# Patient Record
Sex: FEMALE | Race: WHITE | NOT HISPANIC OR LATINO
[De-identification: names, ages, dates, MRNs, and addresses within clinical notes are randomized per-mention and may not be internally consistent; named-entity substitution may affect disease eponyms.]

---

## 2017-03-27 ENCOUNTER — APPOINTMENT (OUTPATIENT)
Dept: ORTHOPEDIC SURGERY | Facility: CLINIC | Age: 67
End: 2017-03-27

## 2017-12-28 ENCOUNTER — OUTPATIENT (OUTPATIENT)
Dept: OUTPATIENT SERVICES | Facility: HOSPITAL | Age: 67
LOS: 1 days | End: 2017-12-28
Payer: MEDICARE

## 2017-12-28 VITALS
RESPIRATION RATE: 14 BRPM | OXYGEN SATURATION: 98 % | WEIGHT: 200.62 LBS | DIASTOLIC BLOOD PRESSURE: 70 MMHG | SYSTOLIC BLOOD PRESSURE: 146 MMHG | HEIGHT: 61 IN | TEMPERATURE: 98 F | HEART RATE: 75 BPM

## 2017-12-28 DIAGNOSIS — Z01.818 ENCOUNTER FOR OTHER PREPROCEDURAL EXAMINATION: ICD-10-CM

## 2017-12-28 DIAGNOSIS — Z87.39 PERSONAL HISTORY OF OTHER DISEASES OF THE MUSCULOSKELETAL SYSTEM AND CONNECTIVE TISSUE: ICD-10-CM

## 2017-12-28 DIAGNOSIS — M17.12 UNILATERAL PRIMARY OSTEOARTHRITIS, LEFT KNEE: ICD-10-CM

## 2017-12-28 LAB
ALBUMIN SERPL ELPH-MCNC: 3.4 G/DL — SIGNIFICANT CHANGE UP (ref 3.3–5)
ALP SERPL-CCNC: 61 U/L — SIGNIFICANT CHANGE UP (ref 30–120)
ALT FLD-CCNC: 58 U/L DA — SIGNIFICANT CHANGE UP (ref 10–60)
ANION GAP SERPL CALC-SCNC: 6 MMOL/L — SIGNIFICANT CHANGE UP (ref 5–17)
APTT BLD: 29.7 SEC — SIGNIFICANT CHANGE UP (ref 27.5–37.4)
AST SERPL-CCNC: 30 U/L — SIGNIFICANT CHANGE UP (ref 10–40)
BILIRUB SERPL-MCNC: 0.3 MG/DL — SIGNIFICANT CHANGE UP (ref 0.2–1.2)
BLD GP AB SCN SERPL QL: SIGNIFICANT CHANGE UP
BUN SERPL-MCNC: 20 MG/DL — SIGNIFICANT CHANGE UP (ref 7–23)
CALCIUM SERPL-MCNC: 9.4 MG/DL — SIGNIFICANT CHANGE UP (ref 8.4–10.5)
CHLORIDE SERPL-SCNC: 104 MMOL/L — SIGNIFICANT CHANGE UP (ref 96–108)
CO2 SERPL-SCNC: 27 MMOL/L — SIGNIFICANT CHANGE UP (ref 22–31)
CREAT SERPL-MCNC: 0.76 MG/DL — SIGNIFICANT CHANGE UP (ref 0.5–1.3)
GLUCOSE SERPL-MCNC: 127 MG/DL — HIGH (ref 70–99)
HCT VFR BLD CALC: 46.3 % — HIGH (ref 34.5–45)
HGB BLD-MCNC: 16 G/DL — HIGH (ref 11.5–15.5)
INR BLD: 0.94 RATIO — SIGNIFICANT CHANGE UP (ref 0.88–1.16)
MCHC RBC-ENTMCNC: 30.5 PG — SIGNIFICANT CHANGE UP (ref 27–34)
MCHC RBC-ENTMCNC: 34.5 GM/DL — SIGNIFICANT CHANGE UP (ref 32–36)
MCV RBC AUTO: 88.3 FL — SIGNIFICANT CHANGE UP (ref 80–100)
PLATELET # BLD AUTO: 192 K/UL — SIGNIFICANT CHANGE UP (ref 150–400)
POTASSIUM SERPL-MCNC: 4.2 MMOL/L — SIGNIFICANT CHANGE UP (ref 3.5–5.3)
POTASSIUM SERPL-SCNC: 4.2 MMOL/L — SIGNIFICANT CHANGE UP (ref 3.5–5.3)
PROT SERPL-MCNC: 7.3 G/DL — SIGNIFICANT CHANGE UP (ref 6–8.3)
PROTHROM AB SERPL-ACNC: 10.2 SEC — SIGNIFICANT CHANGE UP (ref 9.8–12.7)
RBC # BLD: 5.25 M/UL — HIGH (ref 3.8–5.2)
RBC # FLD: 12.3 % — SIGNIFICANT CHANGE UP (ref 10.3–14.5)
SODIUM SERPL-SCNC: 137 MMOL/L — SIGNIFICANT CHANGE UP (ref 135–145)
WBC # BLD: 9.9 K/UL — SIGNIFICANT CHANGE UP (ref 3.8–10.5)
WBC # FLD AUTO: 9.9 K/UL — SIGNIFICANT CHANGE UP (ref 3.8–10.5)

## 2017-12-28 PROCEDURE — 86900 BLOOD TYPING SEROLOGIC ABO: CPT

## 2017-12-28 PROCEDURE — 93010 ELECTROCARDIOGRAM REPORT: CPT | Mod: NC

## 2017-12-28 PROCEDURE — G0463: CPT

## 2017-12-28 PROCEDURE — 87640 STAPH A DNA AMP PROBE: CPT

## 2017-12-28 PROCEDURE — 93005 ELECTROCARDIOGRAM TRACING: CPT

## 2017-12-28 PROCEDURE — 86850 RBC ANTIBODY SCREEN: CPT

## 2017-12-28 PROCEDURE — 87641 MR-STAPH DNA AMP PROBE: CPT

## 2017-12-28 PROCEDURE — 80053 COMPREHEN METABOLIC PANEL: CPT

## 2017-12-28 PROCEDURE — 85730 THROMBOPLASTIN TIME PARTIAL: CPT

## 2017-12-28 PROCEDURE — 85610 PROTHROMBIN TIME: CPT

## 2017-12-28 PROCEDURE — 85027 COMPLETE CBC AUTOMATED: CPT

## 2017-12-28 PROCEDURE — 86901 BLOOD TYPING SEROLOGIC RH(D): CPT

## 2017-12-28 RX ORDER — PREGABALIN 225 MG/1
1 CAPSULE ORAL
Qty: 0 | Refills: 0 | COMMUNITY

## 2017-12-28 NOTE — H&P PST ADULT - PMH
Anxiety    Dyslipidemia    H/O vein stripping  bilateral right 2006 and left 2016  History of osteoarthritis    HTN (Hypertension)    Hypertension    Insomnia    Lumbar stenosis    Morbid obesity    Scoliosis

## 2017-12-28 NOTE — H&P PST ADULT - HISTORY OF PRESENT ILLNESS
67 y.o . female presents here w/ long standing hx. of left knee pain 10/10 w/ decreased R.O.M., ambulation, mobility and ADL function. Seen by Dr. Castellon and referred for surgery. P.T. and Injection no help.  Takes Ibuprofen for pain w some relief rarely last dose 2 months ago

## 2017-12-28 NOTE — H&P PST ADULT - LYMPH NODES
August 28, 2017    1) Stop lisinopril.    2) Start losartan 50 mg daily in am.    See Dr. Sommer on Wednesday as scheduled.    Raul Solis MD   No lymphadedenopathy

## 2017-12-28 NOTE — H&P PST ADULT - PROBLEM SELECTOR PLAN 1
Left Total Knee Replacement  NPO after Midnight. Take pepcid as ordered.  Nose cx instructions reviewed. Dietary instructions reviewed. 3day wipes reviewed. D/C instructions reviewed.  Patient advised of no jewelry day of surgery.  Handouts given.  Medication reviewed. Stop NSAIDS, ASA herbals, vit E per PMD instructions or 7 days prior to surgery .  Medical Clearance to be obtained.  O.A. D.J.D left knee  Day of Surgery you can take the following meds with a small sip of water. migue

## 2017-12-28 NOTE — H&P PST ADULT - NSANTHOSAYNRD_GEN_A_CORE
No. ARI screening performed.  STOP BANG Legend: 0-2 = LOW Risk; 3-4 = INTERMEDIATE Risk; 5-8 = HIGH Risk

## 2017-12-29 LAB
MRSA PCR RESULT.: SIGNIFICANT CHANGE UP
S AUREUS DNA NOSE QL NAA+PROBE: SIGNIFICANT CHANGE UP

## 2018-01-05 ENCOUNTER — APPOINTMENT (OUTPATIENT)
Dept: INTERNAL MEDICINE | Facility: CLINIC | Age: 68
End: 2018-01-05
Payer: MEDICARE

## 2018-01-05 VITALS
HEIGHT: 62 IN | BODY MASS INDEX: 36.8 KG/M2 | SYSTOLIC BLOOD PRESSURE: 154 MMHG | DIASTOLIC BLOOD PRESSURE: 70 MMHG | WEIGHT: 200 LBS | HEART RATE: 68 BPM | OXYGEN SATURATION: 96 % | TEMPERATURE: 98.5 F

## 2018-01-05 PROCEDURE — 99214 OFFICE O/P EST MOD 30 MIN: CPT

## 2018-01-05 RX ORDER — APREPITANT 80 MG/1
40 CAPSULE ORAL ONCE
Qty: 0 | Refills: 0 | Status: COMPLETED | OUTPATIENT
Start: 2018-02-06 | End: 2018-02-06

## 2018-01-12 RX ORDER — SODIUM CHLORIDE 9 MG/ML
1000 INJECTION, SOLUTION INTRAVENOUS
Qty: 0 | Refills: 0 | Status: DISCONTINUED | OUTPATIENT
Start: 2018-02-06 | End: 2018-02-07

## 2018-01-12 RX ORDER — MAGNESIUM HYDROXIDE 400 MG/1
30 TABLET, CHEWABLE ORAL DAILY
Qty: 0 | Refills: 0 | Status: DISCONTINUED | OUTPATIENT
Start: 2018-02-06 | End: 2018-02-09

## 2018-01-12 RX ORDER — ONDANSETRON 8 MG/1
4 TABLET, FILM COATED ORAL EVERY 6 HOURS
Qty: 0 | Refills: 0 | Status: DISCONTINUED | OUTPATIENT
Start: 2018-02-06 | End: 2018-02-09

## 2018-01-12 RX ORDER — SENNA PLUS 8.6 MG/1
2 TABLET ORAL AT BEDTIME
Qty: 0 | Refills: 0 | Status: DISCONTINUED | OUTPATIENT
Start: 2018-02-06 | End: 2018-02-09

## 2018-01-12 RX ORDER — PANTOPRAZOLE SODIUM 20 MG/1
40 TABLET, DELAYED RELEASE ORAL
Qty: 0 | Refills: 0 | Status: DISCONTINUED | OUTPATIENT
Start: 2018-02-06 | End: 2018-02-09

## 2018-01-12 RX ORDER — POLYETHYLENE GLYCOL 3350 17 G/17G
17 POWDER, FOR SOLUTION ORAL DAILY
Qty: 0 | Refills: 0 | Status: DISCONTINUED | OUTPATIENT
Start: 2018-02-06 | End: 2018-02-09

## 2018-01-24 ENCOUNTER — OUTPATIENT (OUTPATIENT)
Dept: OUTPATIENT SERVICES | Facility: HOSPITAL | Age: 68
LOS: 1 days | End: 2018-01-24
Payer: MEDICARE

## 2018-01-24 VITALS
HEIGHT: 62 IN | DIASTOLIC BLOOD PRESSURE: 78 MMHG | RESPIRATION RATE: 18 BRPM | HEART RATE: 88 BPM | OXYGEN SATURATION: 98 % | TEMPERATURE: 98 F | SYSTOLIC BLOOD PRESSURE: 148 MMHG | WEIGHT: 199.96 LBS

## 2018-01-24 DIAGNOSIS — Z01.818 ENCOUNTER FOR OTHER PREPROCEDURAL EXAMINATION: ICD-10-CM

## 2018-01-24 DIAGNOSIS — M17.12 UNILATERAL PRIMARY OSTEOARTHRITIS, LEFT KNEE: ICD-10-CM

## 2018-01-24 DIAGNOSIS — M24.662 ANKYLOSIS, LEFT KNEE: ICD-10-CM

## 2018-01-24 LAB
ALBUMIN SERPL ELPH-MCNC: 3.6 G/DL — SIGNIFICANT CHANGE UP (ref 3.3–5)
ALP SERPL-CCNC: 58 U/L — SIGNIFICANT CHANGE UP (ref 30–120)
ALT FLD-CCNC: 55 U/L DA — SIGNIFICANT CHANGE UP (ref 10–60)
ANION GAP SERPL CALC-SCNC: 10 MMOL/L — SIGNIFICANT CHANGE UP (ref 5–17)
APTT BLD: 29.6 SEC — SIGNIFICANT CHANGE UP (ref 27.5–37.4)
AST SERPL-CCNC: 34 U/L — SIGNIFICANT CHANGE UP (ref 10–40)
BILIRUB SERPL-MCNC: 0.4 MG/DL — SIGNIFICANT CHANGE UP (ref 0.2–1.2)
BLD GP AB SCN SERPL QL: SIGNIFICANT CHANGE UP
BUN SERPL-MCNC: 17 MG/DL — SIGNIFICANT CHANGE UP (ref 7–23)
CALCIUM SERPL-MCNC: 9.3 MG/DL — SIGNIFICANT CHANGE UP (ref 8.4–10.5)
CHLORIDE SERPL-SCNC: 103 MMOL/L — SIGNIFICANT CHANGE UP (ref 96–108)
CO2 SERPL-SCNC: 29 MMOL/L — SIGNIFICANT CHANGE UP (ref 22–31)
CREAT SERPL-MCNC: 0.72 MG/DL — SIGNIFICANT CHANGE UP (ref 0.5–1.3)
GLUCOSE SERPL-MCNC: 111 MG/DL — HIGH (ref 70–99)
HCT VFR BLD CALC: 47.2 % — HIGH (ref 34.5–45)
HGB BLD-MCNC: 16 G/DL — HIGH (ref 11.5–15.5)
INR BLD: 0.96 RATIO — SIGNIFICANT CHANGE UP (ref 0.88–1.16)
MCHC RBC-ENTMCNC: 29.3 PG — SIGNIFICANT CHANGE UP (ref 27–34)
MCHC RBC-ENTMCNC: 33.9 GM/DL — SIGNIFICANT CHANGE UP (ref 32–36)
MCV RBC AUTO: 86.5 FL — SIGNIFICANT CHANGE UP (ref 80–100)
MRSA PCR RESULT.: SIGNIFICANT CHANGE UP
PLATELET # BLD AUTO: 187 K/UL — SIGNIFICANT CHANGE UP (ref 150–400)
POTASSIUM SERPL-MCNC: 4.4 MMOL/L — SIGNIFICANT CHANGE UP (ref 3.5–5.3)
POTASSIUM SERPL-SCNC: 4.4 MMOL/L — SIGNIFICANT CHANGE UP (ref 3.5–5.3)
PROT SERPL-MCNC: 7.6 G/DL — SIGNIFICANT CHANGE UP (ref 6–8.3)
PROTHROM AB SERPL-ACNC: 10.4 SEC — SIGNIFICANT CHANGE UP (ref 9.8–12.7)
RBC # BLD: 5.45 M/UL — HIGH (ref 3.8–5.2)
RBC # FLD: 12 % — SIGNIFICANT CHANGE UP (ref 10.3–14.5)
S AUREUS DNA NOSE QL NAA+PROBE: SIGNIFICANT CHANGE UP
SODIUM SERPL-SCNC: 142 MMOL/L — SIGNIFICANT CHANGE UP (ref 135–145)
WBC # BLD: 9.3 K/UL — SIGNIFICANT CHANGE UP (ref 3.8–10.5)
WBC # FLD AUTO: 9.3 K/UL — SIGNIFICANT CHANGE UP (ref 3.8–10.5)

## 2018-01-24 PROCEDURE — 85027 COMPLETE CBC AUTOMATED: CPT

## 2018-01-24 PROCEDURE — 86901 BLOOD TYPING SEROLOGIC RH(D): CPT

## 2018-01-24 PROCEDURE — 85730 THROMBOPLASTIN TIME PARTIAL: CPT

## 2018-01-24 PROCEDURE — 80053 COMPREHEN METABOLIC PANEL: CPT

## 2018-01-24 PROCEDURE — 87640 STAPH A DNA AMP PROBE: CPT

## 2018-01-24 PROCEDURE — 85610 PROTHROMBIN TIME: CPT

## 2018-01-24 PROCEDURE — 86850 RBC ANTIBODY SCREEN: CPT

## 2018-01-24 PROCEDURE — G0463: CPT

## 2018-01-24 PROCEDURE — 86900 BLOOD TYPING SEROLOGIC ABO: CPT

## 2018-01-24 RX ORDER — LANOLIN ALCOHOL/MO/W.PET/CERES
1 CREAM (GRAM) TOPICAL
Qty: 0 | Refills: 0 | COMMUNITY

## 2018-01-24 NOTE — H&P PST ADULT - HISTORY OF PRESENT ILLNESS
68 y.o . female presents here w/ long standing hx. of left knee pain 10/10 w/ decreased R.O.M., ambulation, mobility and ADL function. Seen by Dr. Castellon and referred for surgery. P.T. and Injection no help.  Takes Ibuprofen for pain w some relief rarely last dose 2 months ago. Cancelled surgery from 1/16/18 and rescheduled for 2/6/18 as she was not feeling good

## 2018-01-24 NOTE — H&P PST ADULT - RS GEN PE MLT RESP DETAILS PC
respirations non-labored/clear to auscultation bilaterally/good air movement/breath sounds equal/normal/airway patent

## 2018-01-24 NOTE — H&P PST ADULT - PMH
Anxiety    Dyslipidemia    H/O vein stripping  bilateral right 2006 and left 2016  History of osteoarthritis    HTN (Hypertension)    Insomnia    Lumbar stenosis    Morbid obesity    OA (osteoarthritis) of knee    Scoliosis

## 2018-02-05 RX ORDER — CELECOXIB 200 MG/1
200 CAPSULE ORAL ONCE
Qty: 0 | Refills: 0 | Status: COMPLETED | OUTPATIENT
Start: 2018-02-06 | End: 2018-02-06

## 2018-02-05 RX ORDER — DOCUSATE SODIUM 100 MG
100 CAPSULE ORAL THREE TIMES A DAY
Qty: 0 | Refills: 0 | Status: DISCONTINUED | OUTPATIENT
Start: 2018-02-06 | End: 2018-02-09

## 2018-02-06 ENCOUNTER — RESULT REVIEW (OUTPATIENT)
Age: 68
End: 2018-02-06

## 2018-02-06 ENCOUNTER — INPATIENT (INPATIENT)
Facility: HOSPITAL | Age: 68
LOS: 2 days | Discharge: HOME | DRG: 470 | End: 2018-02-09
Attending: SPECIALIST | Admitting: SPECIALIST
Payer: MEDICARE

## 2018-02-06 ENCOUNTER — TRANSCRIPTION ENCOUNTER (OUTPATIENT)
Age: 68
End: 2018-02-06

## 2018-02-06 VITALS
RESPIRATION RATE: 13 BRPM | OXYGEN SATURATION: 97 % | HEIGHT: 62 IN | DIASTOLIC BLOOD PRESSURE: 73 MMHG | SYSTOLIC BLOOD PRESSURE: 179 MMHG | HEART RATE: 84 BPM | WEIGHT: 197.09 LBS | TEMPERATURE: 98 F

## 2018-02-06 DIAGNOSIS — E78.5 HYPERLIPIDEMIA, UNSPECIFIED: ICD-10-CM

## 2018-02-06 DIAGNOSIS — M17.12 UNILATERAL PRIMARY OSTEOARTHRITIS, LEFT KNEE: ICD-10-CM

## 2018-02-06 DIAGNOSIS — E66.9 OBESITY, UNSPECIFIED: ICD-10-CM

## 2018-02-06 DIAGNOSIS — Z01.818 ENCOUNTER FOR OTHER PREPROCEDURAL EXAMINATION: ICD-10-CM

## 2018-02-06 DIAGNOSIS — Z47.1 AFTERCARE FOLLOWING JOINT REPLACEMENT SURGERY: ICD-10-CM

## 2018-02-06 DIAGNOSIS — I10 ESSENTIAL (PRIMARY) HYPERTENSION: ICD-10-CM

## 2018-02-06 DIAGNOSIS — F41.9 ANXIETY DISORDER, UNSPECIFIED: ICD-10-CM

## 2018-02-06 LAB
ANION GAP SERPL CALC-SCNC: 15 MMOL/L — SIGNIFICANT CHANGE UP (ref 5–17)
BUN SERPL-MCNC: 20 MG/DL — SIGNIFICANT CHANGE UP (ref 7–23)
CALCIUM SERPL-MCNC: 8.7 MG/DL — SIGNIFICANT CHANGE UP (ref 8.4–10.5)
CHLORIDE SERPL-SCNC: 101 MMOL/L — SIGNIFICANT CHANGE UP (ref 96–108)
CO2 SERPL-SCNC: 22 MMOL/L — SIGNIFICANT CHANGE UP (ref 22–31)
CREAT SERPL-MCNC: 1.2 MG/DL — SIGNIFICANT CHANGE UP (ref 0.5–1.3)
GLUCOSE SERPL-MCNC: 231 MG/DL — HIGH (ref 70–99)
HCT VFR BLD CALC: 40 % — SIGNIFICANT CHANGE UP (ref 34.5–45)
HGB BLD-MCNC: 13.7 G/DL — SIGNIFICANT CHANGE UP (ref 11.5–15.5)
POTASSIUM SERPL-MCNC: 4.1 MMOL/L — SIGNIFICANT CHANGE UP (ref 3.5–5.3)
POTASSIUM SERPL-SCNC: 4.1 MMOL/L — SIGNIFICANT CHANGE UP (ref 3.5–5.3)
SODIUM SERPL-SCNC: 138 MMOL/L — SIGNIFICANT CHANGE UP (ref 135–145)

## 2018-02-06 PROCEDURE — 88311 DECALCIFY TISSUE: CPT | Mod: 26

## 2018-02-06 PROCEDURE — 99223 1ST HOSP IP/OBS HIGH 75: CPT

## 2018-02-06 PROCEDURE — 73562 X-RAY EXAM OF KNEE 3: CPT | Mod: 26,LT

## 2018-02-06 PROCEDURE — 88305 TISSUE EXAM BY PATHOLOGIST: CPT | Mod: 26

## 2018-02-06 RX ORDER — NIACIN 50 MG
500 TABLET ORAL AT BEDTIME
Qty: 0 | Refills: 0 | Status: DISCONTINUED | OUTPATIENT
Start: 2018-02-06 | End: 2018-02-09

## 2018-02-06 RX ORDER — FAMOTIDINE 10 MG/ML
0 INJECTION INTRAVENOUS
Qty: 0 | Refills: 0 | COMMUNITY

## 2018-02-06 RX ORDER — SODIUM CHLORIDE 9 MG/ML
1000 INJECTION, SOLUTION INTRAVENOUS
Qty: 0 | Refills: 0 | Status: DISCONTINUED | OUTPATIENT
Start: 2018-02-06 | End: 2018-02-06

## 2018-02-06 RX ORDER — SENNA PLUS 8.6 MG/1
2 TABLET ORAL
Qty: 0 | Refills: 0 | COMMUNITY
Start: 2018-02-06

## 2018-02-06 RX ORDER — OXYCODONE HYDROCHLORIDE 5 MG/1
5 TABLET ORAL EVERY 4 HOURS
Qty: 0 | Refills: 0 | Status: DISCONTINUED | OUTPATIENT
Start: 2018-02-06 | End: 2018-02-06

## 2018-02-06 RX ORDER — ACETAMINOPHEN 500 MG
1000 TABLET ORAL EVERY 8 HOURS
Qty: 0 | Refills: 0 | Status: DISCONTINUED | OUTPATIENT
Start: 2018-02-07 | End: 2018-02-09

## 2018-02-06 RX ORDER — ACETAMINOPHEN 500 MG
1000 TABLET ORAL ONCE
Qty: 0 | Refills: 0 | Status: COMPLETED | OUTPATIENT
Start: 2018-02-06 | End: 2018-02-06

## 2018-02-06 RX ORDER — HYDROMORPHONE HYDROCHLORIDE 2 MG/ML
0.5 INJECTION INTRAMUSCULAR; INTRAVENOUS; SUBCUTANEOUS
Qty: 0 | Refills: 0 | Status: DISCONTINUED | OUTPATIENT
Start: 2018-02-06 | End: 2018-02-09

## 2018-02-06 RX ORDER — CELECOXIB 200 MG/1
1 CAPSULE ORAL
Qty: 40 | Refills: 1 | OUTPATIENT
Start: 2018-02-06 | End: 2018-03-17

## 2018-02-06 RX ORDER — OXYCODONE HYDROCHLORIDE 5 MG/1
5 TABLET ORAL
Qty: 0 | Refills: 0 | Status: DISCONTINUED | OUTPATIENT
Start: 2018-02-06 | End: 2018-02-09

## 2018-02-06 RX ORDER — ACETAMINOPHEN 500 MG
1000 TABLET ORAL EVERY 6 HOURS
Qty: 0 | Refills: 0 | Status: COMPLETED | OUTPATIENT
Start: 2018-02-06 | End: 2018-02-07

## 2018-02-06 RX ORDER — DOCUSATE SODIUM 100 MG
1 CAPSULE ORAL
Qty: 0 | Refills: 0 | COMMUNITY
Start: 2018-02-06

## 2018-02-06 RX ORDER — ASPIRIN/CALCIUM CARB/MAGNESIUM 324 MG
162 TABLET ORAL EVERY 12 HOURS
Qty: 0 | Refills: 0 | Status: DISCONTINUED | OUTPATIENT
Start: 2018-02-07 | End: 2018-02-09

## 2018-02-06 RX ORDER — PANTOPRAZOLE SODIUM 20 MG/1
1 TABLET, DELAYED RELEASE ORAL
Qty: 41 | Refills: 0 | OUTPATIENT
Start: 2018-02-06 | End: 2018-03-18

## 2018-02-06 RX ORDER — CEFAZOLIN SODIUM 1 G
2000 VIAL (EA) INJECTION EVERY 8 HOURS
Qty: 0 | Refills: 0 | Status: COMPLETED | OUTPATIENT
Start: 2018-02-06 | End: 2018-02-07

## 2018-02-06 RX ORDER — UBIDECARENONE 100 MG
200 CAPSULE ORAL
Qty: 0 | Refills: 0 | COMMUNITY

## 2018-02-06 RX ORDER — ASPIRIN/CALCIUM CARB/MAGNESIUM 324 MG
2 TABLET ORAL
Qty: 164 | Refills: 0
Start: 2018-02-06 | End: 2018-03-18

## 2018-02-06 RX ORDER — FENTANYL CITRATE 50 UG/ML
25 INJECTION INTRAVENOUS
Qty: 0 | Refills: 0 | Status: DISCONTINUED | OUTPATIENT
Start: 2018-02-06 | End: 2018-02-06

## 2018-02-06 RX ORDER — TRANEXAMIC ACID 100 MG/ML
1000 INJECTION, SOLUTION INTRAVENOUS ONCE
Qty: 0 | Refills: 0 | Status: COMPLETED | OUTPATIENT
Start: 2018-02-06 | End: 2018-02-06

## 2018-02-06 RX ORDER — ACETAMINOPHEN 500 MG
2 TABLET ORAL
Qty: 0 | Refills: 0 | COMMUNITY
Start: 2018-02-06 | End: 2018-02-20

## 2018-02-06 RX ORDER — LOSARTAN POTASSIUM 100 MG/1
100 TABLET, FILM COATED ORAL DAILY
Qty: 0 | Refills: 0 | Status: DISCONTINUED | OUTPATIENT
Start: 2018-02-08 | End: 2018-02-09

## 2018-02-06 RX ORDER — POLYETHYLENE GLYCOL 3350 17 G/17G
17 POWDER, FOR SOLUTION ORAL
Qty: 0 | Refills: 0 | COMMUNITY
Start: 2018-02-06

## 2018-02-06 RX ORDER — YOHIMBE BARK 500 MG
2 CAPSULE ORAL
Qty: 0 | Refills: 0 | COMMUNITY

## 2018-02-06 RX ORDER — AMLODIPINE BESYLATE 2.5 MG/1
10 TABLET ORAL DAILY
Qty: 0 | Refills: 0 | Status: DISCONTINUED | OUTPATIENT
Start: 2018-02-06 | End: 2018-02-09

## 2018-02-06 RX ORDER — CEFAZOLIN SODIUM 1 G
2000 VIAL (EA) INJECTION ONCE
Qty: 0 | Refills: 0 | Status: COMPLETED | OUTPATIENT
Start: 2018-02-06 | End: 2018-02-06

## 2018-02-06 RX ORDER — OXYCODONE HYDROCHLORIDE 5 MG/1
10 TABLET ORAL
Qty: 0 | Refills: 0 | Status: DISCONTINUED | OUTPATIENT
Start: 2018-02-06 | End: 2018-02-09

## 2018-02-06 RX ORDER — MAGNESIUM OXIDE 400 MG ORAL TABLET 241.3 MG
200 TABLET ORAL AT BEDTIME
Qty: 0 | Refills: 0 | Status: DISCONTINUED | OUTPATIENT
Start: 2018-02-06 | End: 2018-02-09

## 2018-02-06 RX ORDER — CELECOXIB 200 MG/1
200 CAPSULE ORAL
Qty: 0 | Refills: 0 | Status: DISCONTINUED | OUTPATIENT
Start: 2018-02-07 | End: 2018-02-09

## 2018-02-06 RX ADMIN — SODIUM CHLORIDE 50 MILLILITER(S): 9 INJECTION, SOLUTION INTRAVENOUS at 15:17

## 2018-02-06 RX ADMIN — Medication 400 MILLIGRAM(S): at 19:28

## 2018-02-06 RX ADMIN — FENTANYL CITRATE 25 MICROGRAM(S): 50 INJECTION INTRAVENOUS at 15:47

## 2018-02-06 RX ADMIN — APREPITANT 40 MILLIGRAM(S): 80 CAPSULE ORAL at 11:04

## 2018-02-06 RX ADMIN — FENTANYL CITRATE 25 MICROGRAM(S): 50 INJECTION INTRAVENOUS at 15:27

## 2018-02-06 RX ADMIN — Medication 1000 MILLIGRAM(S): at 20:00

## 2018-02-06 RX ADMIN — MAGNESIUM OXIDE 400 MG ORAL TABLET 200 MILLIGRAM(S): 241.3 TABLET ORAL at 21:38

## 2018-02-06 RX ADMIN — Medication 500 MILLIGRAM(S): at 21:38

## 2018-02-06 RX ADMIN — CELECOXIB 200 MILLIGRAM(S): 200 CAPSULE ORAL at 11:04

## 2018-02-06 RX ADMIN — Medication 100 MILLIGRAM(S): at 20:31

## 2018-02-06 RX ADMIN — SODIUM CHLORIDE 100 MILLILITER(S): 9 INJECTION, SOLUTION INTRAVENOUS at 16:41

## 2018-02-06 NOTE — PHYSICAL THERAPY INITIAL EVALUATION ADULT - RANGE OF MOTION EXAMINATION, REHAB EVAL
Right LE ROM was WFL (within functional limits)/left knee 10-70 deg flexion/deficits as listed below

## 2018-02-06 NOTE — DISCHARGE NOTE ADULT - COMMUNITY RESOURCES
Nurse to see patient day after discharge Nurse to visit the day after hospital discharge; Physical therapist to follow. Please contact the home care agency at the above phone number if you have not heard from them by 12 noon on the day after your hospital discharge.

## 2018-02-06 NOTE — DISCHARGE NOTE ADULT - PLAN OF CARE
Improve ambulation, ADLs and quality of life Physical Therapy/Occupational Therapy for: ambulation, transfers, stairs, ADL's (activities of daily living), range of motion exercises, and isometrics  -Activity  • Weight Bearing as tolerated with rolling walker.  • Take short, frequent walks increasing the distance that you walk each day as tolerated.  • Change your position every hour to decrease pain and stiffness.  • Continue the exercises taught to you by your physical therapist.  • No driving until cleared by the doctor.  • No tub baths, hot tubs, or swimming pools until instructed by your doctor.  • Do not squat down on the floor.  • Do not kneel or twist your knee.  • Range of Motion Goals: Flexion= 120 degrees, Extension = 0 degrees  Keep incision clean and dry. May shower 5 days after surgery if no drainage from incision.  Prineo tape removal 2 weeks after surgery at Surgeon's office.  - Call your doctor if you experience:  • An increase in pain not controlled by pain medication or change in activity or position.  • Temperature greater than 101° F.  • Redness, increased swelling or foul smelling drainage from or around the incision.  • Numbness, tingling or a change in color or temperature of the operative leg.  • Call your doctor immediately if you experience chest pain, shortness of breath or calf pain. - Use CPM 2 hours, 2 times a day  - Start 0-60 degrees  - Advance 5-10 degrees each session as tolerated  to goal 110 degrees  - Consider Pain meds prior to CPM  -  Apply ice to knee Post CPM - Call your doctor if you experience:  • An increase in pain not controlled by pain medication or change in activity or  position.  • Temperature greater than 101° F.  • Redness, increased swelling or foul smelling drainage from or around the  incision.  • Numbness, tingling or a change in color or temperature of the operative extremity.  • Call your doctor immediately if you experience chest pain, shortness of breath or calf pain.  Follow all verbal and written instructions. Take medications as prescribed. DO NOT drive, operate machinery, and/or make important decisions while on prescription pain medication. DO NOT hesitate to call Doctor's office with questions or concerns  - Call your doctor if you experience:  • An increase in pain not controlled by pain medication or change in activity or  position.  • Temperature greater than 101° F.  • Redness, increased swelling or foul smelling drainage from or around the  incision.  • Numbness, tingling or a change in color or temperature of the operative extremity.  • Call your doctor immediately if you experience chest pain, shortness of breath or calf pain. Physical Therapy/Occupational Therapy for: ambulation, transfers, stairs, ADL's (activities of daily living), range of motion exercises, and isometrics  -Activity  • Weight Bearing as tolerated with rolling walker.  • Take short, frequent walks increasing the distance that you walk each day as tolerated.  • Change your position every hour to decrease pain and stiffness.  • Continue the exercises taught to you by your physical therapist.  • No driving until cleared by the doctor.  • No tub baths, hot tubs, or swimming pools until instructed by your doctor.  • Do not squat down on the floor.  • Do not kneel or twist your knee.  • Range of Motion Goals: Flexion= 110 degrees, Extension = 0 degrees  Keep incision clean and dry. May shower if no drainage noted from knee incision.  Prineo tape removal 2 weeks after surgery at Surgeon's office.  - Call your doctor if you experience:  • An increase in pain not controlled by pain medication or change in activity or position.  • Temperature greater than 101° F.  • Redness, increased swelling or foul smelling drainage from or around the incision.  • Numbness, tingling or a change in color or temperature of the operative leg.  • Call your doctor immediately if you experience chest pain, shortness of breath or calf pain. - Use CPM machine 2 hours, 2 or 3 times a day  - Start 0-60 degrees  - Advance 5-10 degrees each session as tolerated  to goal 110 degrees of flexion  - Consider Pain meds prior to CPM use  -  Apply ice to knee after CPM use - Call your doctor if you experience:  • An increase in pain not controlled by pain medication or change in activity or  position.  • Temperature greater than 101° F.  • Redness, increased swelling or foul smelling drainage from or around the  incision.  • Numbness, tingling or a change in color or temperature of the operative leg.  • Call your doctor immediately if you experience chest pain, shortness of breath or calf pain.

## 2018-02-06 NOTE — DISCHARGE NOTE ADULT - PATIENT PORTAL LINK FT
You can access the [facility] Patient Portal, offered by [facility], by registering with the following website: [email or web address]

## 2018-02-06 NOTE — DISCHARGE NOTE ADULT - CARE PROVIDER_API CALL
Earl Castellon), Orthopaedic Surgery  [address] [place] [address]  Phone: [phone]  Fax: [phone]

## 2018-02-06 NOTE — DISCHARGE NOTE ADULT - NS AS ACTIVITY OBS
No Heavy lifting/straining/Walking-Indoors allowed/Do not drive or operate machinery No Heavy lifting/straining/Do not drive or operate machinery/Walking-Indoors allowed/Walking-Outdoors allowed/may shower if surgical wound completely dry/Showering allowed/Stairs allowed

## 2018-02-06 NOTE — DISCHARGE NOTE ADULT - HOSPITAL COURSE
This patient was admitted to [facility] with a history of severe degenerative joint disease of the Left Knee.  Patient went to Pre-Surgical Testing at [facility] and was medically cleared to undergo elective procedure. Patient underwent Left TKR by Dr. Earl Castellon on 2/6/18. Procedure was well tolerated.  No operative or sharmila-operative complications arose during patients hospital course.  Patient received antibiotic according to SCIP guidelines for infection prevention. Aspirin was given for DVT prophylaxis.  Anesthesia, Medical Hospitalist, Physical Therapy and Occupational Therapy were consulted. Patient is stable for discharge with a good prognosis.  Appropriate discharge instructions and medications are provided in this document. This 69yo female patient was admitted to [facility] with a history of severe degenerative joint disease of the Left Knee.  Patient went to Pre-Surgical Testing at [facility] and was medically cleared to undergo elective procedure. Patient underwent Left TKR by Dr. Earl Castellon on 2/6/18. Procedure was well tolerated.  No operative or sharmila-operative complications arose during patient's hospital course.  Patient received antibiotic according to SCIP guidelines for infection prevention. Aspirin was given for DVT prophylaxis.  Anesthesia, Medical Hospitalist, Physical Therapy and Occupational Therapy were consulted. Patient is stable for discharge home with home care services and with a good prognosis on 2/9/18.  Appropriate discharge instructions and medications are provided in this document.

## 2018-02-06 NOTE — PHYSICAL THERAPY INITIAL EVALUATION ADULT - GAIT TRAINING, PT EVAL
Goals 2-4 days, Pt will ambulate 150 ft w/ rolling walker independently.    Pt will negotiate 4 steps with straight cane independently

## 2018-02-06 NOTE — DISCHARGE NOTE ADULT - CARE PLAN
Principal Discharge DX:	Aftercare following left knee joint replacement surgery  Goal:	Improve ambulation, ADLs and quality of life  Assessment and plan of treatment:	Physical Therapy/Occupational Therapy for: ambulation, transfers, stairs, ADL's (activities of daily living), range of motion exercises, and isometrics  -Activity  • Weight Bearing as tolerated with rolling walker.  • Take short, frequent walks increasing the distance that you walk each day as tolerated.  • Change your position every hour to decrease pain and stiffness.  • Continue the exercises taught to you by your physical therapist.  • No driving until cleared by the doctor.  • No tub baths, hot tubs, or swimming pools until instructed by your doctor.  • Do not squat down on the floor.  • Do not kneel or twist your knee.  • Range of Motion Goals: Flexion= 120 degrees, Extension = 0 degrees  Keep incision clean and dry. May shower 5 days after surgery if no drainage from incision.  Prineo tape removal 2 weeks after surgery at Surgeon's office.  - Call your doctor if you experience:  • An increase in pain not controlled by pain medication or change in activity or position.  • Temperature greater than 101° F.  • Redness, increased swelling or foul smelling drainage from or around the incision.  • Numbness, tingling or a change in color or temperature of the operative leg.  • Call your doctor immediately if you experience chest pain, shortness of breath or calf pain.  Assessment and plan of treatment:	- Use CPM 2 hours, 2 times a day  - Start 0-60 degrees  - Advance 5-10 degrees each session as tolerated  to goal 110 degrees  - Consider Pain meds prior to CPM  -  Apply ice to knee Post CPM Principal Discharge DX:	Aftercare following left knee joint replacement surgery  Goal:	Improve ambulation, ADLs and quality of life  Assessment and plan of treatment:	Physical Therapy/Occupational Therapy for: ambulation, transfers, stairs, ADL's (activities of daily living), range of motion exercises, and isometrics  -Activity  • Weight Bearing as tolerated with rolling walker.  • Take short, frequent walks increasing the distance that you walk each day as tolerated.  • Change your position every hour to decrease pain and stiffness.  • Continue the exercises taught to you by your physical therapist.  • No driving until cleared by the doctor.  • No tub baths, hot tubs, or swimming pools until instructed by your doctor.  • Do not squat down on the floor.  • Do not kneel or twist your knee.  • Range of Motion Goals: Flexion= 120 degrees, Extension = 0 degrees  Keep incision clean and dry. May shower 5 days after surgery if no drainage from incision.  Prineo tape removal 2 weeks after surgery at Surgeon's office.  - Call your doctor if you experience:  • An increase in pain not controlled by pain medication or change in activity or position.  • Temperature greater than 101° F.  • Redness, increased swelling or foul smelling drainage from or around the incision.  • Numbness, tingling or a change in color or temperature of the operative leg.  • Call your doctor immediately if you experience chest pain, shortness of breath or calf pain.  Assessment and plan of treatment:	- Use CPM 2 hours, 2 times a day  - Start 0-60 degrees  - Advance 5-10 degrees each session as tolerated  to goal 110 degrees  - Consider Pain meds prior to CPM  -  Apply ice to knee Post CPM  Assessment and plan of treatment:	- Call your doctor if you experience:  • An increase in pain not controlled by pain medication or change in activity or  position.  • Temperature greater than 101° F.  • Redness, increased swelling or foul smelling drainage from or around the  incision.  • Numbness, tingling or a change in color or temperature of the operative extremity.  • Call your doctor immediately if you experience chest pain, shortness of breath or calf pain.  Follow all verbal and written instructions. Take medications as prescribed. DO NOT drive, operate machinery, and/or make important decisions while on prescription pain medication. DO NOT hesitate to call Doctor's office with questions or concerns  - Call your doctor if you experience:  • An increase in pain not controlled by pain medication or change in activity or  position.  • Temperature greater than 101° F.  • Redness, increased swelling or foul smelling drainage from or around the  incision.  • Numbness, tingling or a change in color or temperature of the operative extremity.  • Call your doctor immediately if you experience chest pain, shortness of breath or calf pain. Principal Discharge DX:	Aftercare following left knee joint replacement surgery  Goal:	Improve ambulation, ADLs and quality of life  Assessment and plan of treatment:	Physical Therapy/Occupational Therapy for: ambulation, transfers, stairs, ADL's (activities of daily living), range of motion exercises, and isometrics  -Activity  • Weight Bearing as tolerated with rolling walker.  • Take short, frequent walks increasing the distance that you walk each day as tolerated.  • Change your position every hour to decrease pain and stiffness.  • Continue the exercises taught to you by your physical therapist.  • No driving until cleared by the doctor.  • No tub baths, hot tubs, or swimming pools until instructed by your doctor.  • Do not squat down on the floor.  • Do not kneel or twist your knee.  • Range of Motion Goals: Flexion= 110 degrees, Extension = 0 degrees  Keep incision clean and dry. May shower if no drainage noted from knee incision.  Prineo tape removal 2 weeks after surgery at Surgeon's office.  - Call your doctor if you experience:  • An increase in pain not controlled by pain medication or change in activity or position.  • Temperature greater than 101° F.  • Redness, increased swelling or foul smelling drainage from or around the incision.  • Numbness, tingling or a change in color or temperature of the operative leg.  • Call your doctor immediately if you experience chest pain, shortness of breath or calf pain.  Assessment and plan of treatment:	- Use CPM machine 2 hours, 2 or 3 times a day  - Start 0-60 degrees  - Advance 5-10 degrees each session as tolerated  to goal 110 degrees of flexion  - Consider Pain meds prior to CPM use  -  Apply ice to knee after CPM use Principal Discharge DX:	Aftercare following left knee joint replacement surgery  Goal:	Improve ambulation, ADLs and quality of life  Assessment and plan of treatment:	Physical Therapy/Occupational Therapy for: ambulation, transfers, stairs, ADL's (activities of daily living), range of motion exercises, and isometrics  -Activity  • Weight Bearing as tolerated with rolling walker.  • Take short, frequent walks increasing the distance that you walk each day as tolerated.  • Change your position every hour to decrease pain and stiffness.  • Continue the exercises taught to you by your physical therapist.  • No driving until cleared by the doctor.  • No tub baths, hot tubs, or swimming pools until instructed by your doctor.  • Do not squat down on the floor.  • Do not kneel or twist your knee.  • Range of Motion Goals: Flexion= 110 degrees, Extension = 0 degrees  Keep incision clean and dry. May shower if no drainage noted from knee incision.  Prineo tape removal 2 weeks after surgery at Surgeon's office.  - Call your doctor if you experience:  • An increase in pain not controlled by pain medication or change in activity or position.  • Temperature greater than 101° F.  • Redness, increased swelling or foul smelling drainage from or around the incision.  • Numbness, tingling or a change in color or temperature of the operative leg.  • Call your doctor immediately if you experience chest pain, shortness of breath or calf pain.  Assessment and plan of treatment:	- Use CPM machine 2 hours, 2 or 3 times a day  - Start 0-60 degrees  - Advance 5-10 degrees each session as tolerated  to goal 110 degrees of flexion  - Consider Pain meds prior to CPM use  -  Apply ice to knee after CPM use  Assessment and plan of treatment:	- Call your doctor if you experience:  • An increase in pain not controlled by pain medication or change in activity or  position.  • Temperature greater than 101° F.  • Redness, increased swelling or foul smelling drainage from or around the  incision.  • Numbness, tingling or a change in color or temperature of the operative leg.  • Call your doctor immediately if you experience chest pain, shortness of breath or calf pain.

## 2018-02-06 NOTE — CONSULT NOTE ADULT - SUBJECTIVE AND OBJECTIVE BOX
Patient is a 68y old  Female who presents with a chief complaint of I have left knee pain (2018 15:46)  68 y.o . female presents here w/ long standing hx. of left knee pain 10/10 w/ decreased R.O.M., ambulation, mobility and ADL function. Seen by Dr. Castellon and referred for surgery. P.T. and Injection no help.  Takes Ibuprofen for pain w some relief rarely last dose 2 months ago. Cancelled surgery from 18 and rescheduled for 18 as she was not feeling good    HPI:Pt is seen and examined.    PAST MEDICAL & SURGICAL HISTORY:  OA (osteoarthritis) of knee  Morbid obesity  Insomnia  H/O vein stripping: bilateral right  and left   Lumbar stenosis  History of osteoarthritis  Scoliosis  Anxiety  Dyslipidemia  HTN (Hypertension)  History of Varicose Veins: with surgery  History of Tonsillectomy  History of  Section    MEDICATIONS  (STANDING):  lactated ringers. 1000 milliLiter(s) (50 mL/Hr) IV Continuous <Continuous>    MEDICATIONS  (PRN):  fentaNYL    Injectable 25 MICROGram(s) IV Push every 5 minutes PRN Moderate Pain  oxyCODONE    IR 5 milliGRAM(s) Oral every 4 hours PRN For moderate pain  promethazine IVPB 6.25 milliGRAM(s) IV Intermittent once PRN Nausea and/or Vomiting      Allergies    No Known Allergies    Intolerances      SOCIAL HISTORY:  Smoker:  YES / NO        PACK YEARS:                         WHEN QUIT?  ETOH use:  YES / NO               FREQUENCY / QUANTITY:  Ilicit Drug use:  YES / NO  Occupation:  Assisted device use (Cane / Walker):  Live with:      FAMILY HISTORY:  Hypertension (Mother)      Vital Signs Last 24 Hrs  T(C): 36.9 (2018 09:43), Max: 36.9 (2018 09:43)  T(F): 98.4 (2018 09:43), Max: 98.4 (2018 09:43)  HR: 74 (2018 12:08) (71 - 84)  BP: 98/57 (2018 12:08) (98/57 - 179/73)  BP(mean): --  RR: 12 (2018 12:08) (12 - 19)  SpO2: 95% (2018 12:08) (95% - 97%) Patient is a 68y old  Female who presents with a chief complaint of I have left knee pain (2018 15:46)  68 y.o . female presents here w/ long standing hx. of left knee pain 10/10 w/ decreased R.O.M., ambulation, mobility and ADL function. Seen by Dr. Castellon and referred for surgery. P.T. and Injection no help.  Takes Ibuprofen for pain w some relief rarely last dose 2 months ago. Cancelled surgery from 18 and rescheduled for 18 as she was not feeling good    HPI:Pt is seen and examined.  pain is controlled.  PAST MEDICAL & SURGICAL HISTORY:  OA (osteoarthritis) of knee  Morbid obesity  Insomnia  H/O vein stripping: bilateral right  and left   Lumbar stenosis  History of osteoarthritis  Scoliosis  Anxiety  Dyslipidemia  HTN (Hypertension)  History of Varicose Veins: with surgery  History of Tonsillectomy  History of  Section    MEDICATIONS  (STANDING):  lactated ringers. 1000 milliLiter(s) (50 mL/Hr) IV Continuous <Continuous>    MEDICATIONS  (PRN):  fentaNYL    Injectable 25 MICROGram(s) IV Push every 5 minutes PRN Moderate Pain  oxyCODONE    IR 5 milliGRAM(s) Oral every 4 hours PRN For moderate pain  promethazine IVPB 6.25 milliGRAM(s) IV Intermittent once PRN Nausea and/or Vomiting      Allergies    No Known Allergies    Intolerances      SOCIAL HISTORY:  Smoker:   NO        PACK YEARS:                         WHEN QUIT?  ETOH use:  YES               FREQUENCY / QUANTITY:  Ilicit Drug use:  NO        FAMILY HISTORY:  Hypertension (Mother)      Vital Signs Last 24 Hrs  T(C): 36.9 (2018 09:43), Max: 36.9 (2018 09:43)  T(F): 98.4 (2018 09:43), Max: 98.4 (2018 09:43)  HR: 74 (2018 12:08) (71 - 84)  BP: 98/57 (2018 12:08) (98/57 - 179/73)  BP(mean): --  RR: 12 (2018 12:08) (12 - 19)  SpO2: 95% (2018 12:08) (95% - 97%)

## 2018-02-06 NOTE — BRIEF OPERATIVE NOTE - PROCEDURE
<<-----Click on this checkbox to enter Procedure Knee replacement  02/06/2018  left  Active  Lester Parikh

## 2018-02-06 NOTE — CONSULT NOTE ADULT - NEUROLOGICAL
Problem: Patient Care Overview  Goal: Individualization & Mutuality  Outcome: No Change  Afebrile. VSS. Continues to leak scant amounts of clear fluid. Denies ctx, cramping, uterine tenderness, chills, and bleeding. Had pea size amount of mucous following monitoring this shift, pt encouraged to report if increases or having cramping/bleeding. Fetal monitoring AGA with active fetal movement. Continue with current plan of care.         negative Alert & oriented; no sensory, motor or coordination deficits, normal reflexes

## 2018-02-06 NOTE — DISCHARGE NOTE ADULT - ADDITIONAL INSTRUCTIONS
follow up with Dr. Castellon 2 weeks post-operatively Follow up with Dr. Castellon 2 weeks after surgery.  Call office for appointment.

## 2018-02-06 NOTE — CONSULT NOTE ADULT - PROBLEM SELECTOR RECOMMENDATION 9
pain meds as per anesthesia.  PT/OT.  DVT prophylaxis.  DVT ppx: [ ]ASA81 [ ] [id number] [ ] Lovenox [ ] Coumadin   [ ] Eliquis [  ] Heparin  Dispo:     Home [ ]     Acute Rehab [ ]     [facility] [ ]     TBD [x ] pain meds -oxycodone. Monitor for lethargy and respiratory depression.  PT/OT.  DVT prophylaxis.  DVT ppx: [ ]ASA81 [ x] [id number] [ ] Lovenox [ ] Coumadin   [ ] Eliquis [  ] Heparin  Dispo:     Home [ x]     Acute Rehab [ ]     [facility] [ ]     TBD [ ]

## 2018-02-06 NOTE — PHYSICAL THERAPY INITIAL EVALUATION ADULT - ADDITIONAL COMMENTS
Pt lives with daughter in a house w/ 4 steps to enter (no rail) but has bricks on the side that she can lean on.  No steps inside.

## 2018-02-06 NOTE — DISCHARGE NOTE ADULT - MEDICATION SUMMARY - MEDICATIONS TO TAKE
I will START or STAY ON the medications listed below when I get home from the hospital:    acetaminophen 500 mg oral tablet  -- 2 tab(s) by mouth every 8 hours  -- Indication: For Pain    celecoxib 200 mg oral capsule  -- 1 cap(s) by mouth 2 times a day (with meals)  -- Indication: For Pain    aspirin 81 mg oral delayed release tablet  -- 2 tab(s) by mouth every 12 hours  -- Indication: For Clot prevention    oxyCODONE 5 mg oral tablet  -- 1-2 tab(s) by mouth every 4 hours, As Needed MDD:8  Reference #: [id number]   -- Indication: For Pain    niacin 500 mg oral tablet  -- 1 tab(s) by mouth once a day  -- Indication: For Home med    Diane 10 mg-40 mg oral tablet  -- 1 tab(s) by mouth once a day  -- Indication: For Hypertension    hydroCHLOROthiazide 12.5 mg oral tablet  -- 1 tab(s) by mouth once a day  -- Indication: For Hypertension    senna oral tablet  -- 2 tab(s) by mouth once a day (at bedtime)  -- Indication: For Constipation    docusate sodium 100 mg oral capsule  -- 1 cap(s) by mouth 3 times a day  -- Indication: For Constipation    polyethylene glycol 3350 oral powder for reconstitution  -- 17 gram(s) by mouth once a day, As needed, Constipation  -- Indication: For Constipation    magnesium carbonate 250 mg oral capsule  -- 1 cap(s) by mouth once a day (at bedtime)  -- Indication: For Minerals    glucosamine-chondroitin 500 mg-400 mg oral tablet  -- 1 tab(s) by mouth once a day  -- Indication: For Home med    pantoprazole 40 mg oral delayed release tablet  -- 1 tab(s) by mouth once a day (before a meal)  -- Indication: For Gastrointestinal protection    Vitamin D3 5000 intl units oral tablet  -- 1 tab(s) by mouth once a day  -- Indication: For Vitamin    Vitamin B12 Methylcobalamin 5000 mcg sublingual tablet  -- 1 tab(s) under tongue once a day  -- Indication: For Vitamin I will START or STAY ON the medications listed below when I get home from the hospital:    acetaminophen 500 mg oral tablet  -- 2 tab(s) by mouth every 8 hours  -- Indication: For Pain    celecoxib 200 mg oral capsule  -- 1 cap(s) by mouth 2 times a day (with meals)  -- Indication: For Pain    aspirin 81 mg oral delayed release tablet  -- 2 tab(s) by mouth every 12 hours  -- Indication: For Clot prevention    oxyCODONE 5 mg oral tablet  -- 1-2 tab(s) by mouth every 4 hours, As Needed MDD:8  Reference #: [id number]   -- Indication: For Pain    niacin 500 mg oral tablet  -- 1 tab(s) by mouth once a day  -- Indication: For Home med    Diane 10 mg-40 mg oral tablet  -- 1 tab(s) by mouth once a day  -- Indication: For Hypertension    hydroCHLOROthiazide 12.5 mg oral tablet  -- 1 tab(s) by mouth once a day  -- Indication: For Hypertension    senna oral tablet  -- 2 tab(s) by mouth once a day (at bedtime)  -- Indication: For Constipation    docusate sodium 100 mg oral capsule  -- 1 cap(s) by mouth 3 times a day  -- Indication: For Constipation    polyethylene glycol 3350 oral powder for reconstitution  -- 17 gram(s) by mouth once a day, As needed, Constipation  -- Indication: For Constipation    magnesium carbonate 250 mg oral capsule  -- 1 cap(s) by mouth once a day (at bedtime)  -- Indication: For Minerals    glucosamine-chondroitin 500 mg-400 mg oral tablet  -- 1 tab(s) by mouth once a day  -- Indication: For Home med    pantoprazole 40 mg oral delayed release tablet  -- 1 tab(s) by mouth once a day (before a meal)  -- Indication: For Gastrointestinal protection    Vitamin D3 5000 intl units oral tablet  -- 1 tab(s) by mouth once a day  -- Indication: For Vitamin    Vitamin B12 Methylcobalamin 5000 mcg sublingual tablet  -- 1 tab(s) under tongue once a day  -- Indication: For Vitamin I will START or STAY ON the medications listed below when I get home from the hospital:    rolling walker  -- s/p left total knee replacement  Please dispense 1  -- Indication: For Equipment    oxyCODONE 5 mg oral tablet  -- 1-2 tab(s) by mouth every 4 hours, As Needed MDD:8  Reference #: [id number]   -- Indication: For Pain    acetaminophen 500 mg oral tablet  -- 2 tab(s) by mouth every 8 hours  -- Indication: For Pain    celecoxib 200 mg oral capsule  -- 1 cap(s) by mouth 2 times a day (with meals)  -- Indication: For Pain    aspirin 81 mg oral delayed release tablet  -- 2 tab(s) by mouth every 12 hours  -- Indication: For Clot prevention    niacin 500 mg oral tablet  -- 1 tab(s) by mouth once a day  -- Indication: For Home med    Diane 10 mg-40 mg oral tablet  -- 1 tab(s) by mouth once a day  -- Indication: For Hypertension    hydroCHLOROthiazide 12.5 mg oral tablet  -- 1 tab(s) by mouth once a day  -- Indication: For Hypertension    senna oral tablet  -- 2 tab(s) by mouth once a day (at bedtime)  -- Indication: For Constipation    docusate sodium 100 mg oral capsule  -- 1 cap(s) by mouth 3 times a day  -- Indication: For Constipation    polyethylene glycol 3350 oral powder for reconstitution  -- 17 gram(s) by mouth once a day, As needed, Constipation  -- Indication: For Constipation    magnesium carbonate 250 mg oral capsule  -- 1 cap(s) by mouth once a day (at bedtime)  -- Indication: For Minerals    glucosamine-chondroitin 500 mg-400 mg oral tablet  -- 1 tab(s) by mouth once a day  -- Indication: For Home med    pantoprazole 40 mg oral delayed release tablet  -- 1 tab(s) by mouth once a day (before a meal)  -- Indication: For Gastrointestinal protection    Vitamin D3 5000 intl units oral tablet  -- 1 tab(s) by mouth once a day  -- Indication: For Vitamin    Vitamin B12 Methylcobalamin 5000 mcg sublingual tablet  -- 1 tab(s) under tongue once a day  -- Indication: For Vitamin

## 2018-02-06 NOTE — PHYSICAL THERAPY INITIAL EVALUATION ADULT - GAIT DEVIATIONS NOTED, PT EVAL
decreased step length/decreased velocity of limb motion/decreased stride length/decreased louie/decreased weight-shifting ability/mild buckling left knee

## 2018-02-06 NOTE — DISCHARGE NOTE ADULT - MEDICATION SUMMARY - MEDICATIONS TO STOP TAKING
I will STOP taking the medications listed below when I get home from the hospital:    curcumin  -- 500 milligram(s) by mouth once a day    CoQ10  -- 200 milligram(s) by mouth once a day    msm  -- 0.25 dose(s) by mouth once a day    krilloil/fish oil /Dha 500  -- 1 cap(s) by mouth once a day    famotidine 20 mg oral tablet I will STOP taking the medications listed below when I get home from the hospital:    s-adenosylmethionine 200 mg oral tablet  -- 2 tab(s) by mouth once a day    curcumin  -- 500 milligram(s) by mouth once a day    CoQ10  -- 200 milligram(s) by mouth once a day    msm  -- 0.25 dose(s) by mouth once a day    krilloil/fish oil /Dha 500  -- 1 cap(s) by mouth once a day    famotidine 20 mg oral tablet

## 2018-02-07 DIAGNOSIS — D72.828 OTHER ELEVATED WHITE BLOOD CELL COUNT: ICD-10-CM

## 2018-02-07 LAB
ANION GAP SERPL CALC-SCNC: 11 MMOL/L — SIGNIFICANT CHANGE UP (ref 5–17)
BUN SERPL-MCNC: 17 MG/DL — SIGNIFICANT CHANGE UP (ref 7–23)
CALCIUM SERPL-MCNC: 8.7 MG/DL — SIGNIFICANT CHANGE UP (ref 8.4–10.5)
CHLORIDE SERPL-SCNC: 103 MMOL/L — SIGNIFICANT CHANGE UP (ref 96–108)
CO2 SERPL-SCNC: 26 MMOL/L — SIGNIFICANT CHANGE UP (ref 22–31)
CREAT SERPL-MCNC: 0.81 MG/DL — SIGNIFICANT CHANGE UP (ref 0.5–1.3)
GLUCOSE SERPL-MCNC: 150 MG/DL — HIGH (ref 70–99)
HCT VFR BLD CALC: 37 % — SIGNIFICANT CHANGE UP (ref 34.5–45)
HGB BLD-MCNC: 12.6 G/DL — SIGNIFICANT CHANGE UP (ref 11.5–15.5)
MCHC RBC-ENTMCNC: 29.1 PG — SIGNIFICANT CHANGE UP (ref 27–34)
MCHC RBC-ENTMCNC: 33.9 GM/DL — SIGNIFICANT CHANGE UP (ref 32–36)
MCV RBC AUTO: 85.8 FL — SIGNIFICANT CHANGE UP (ref 80–100)
PLATELET # BLD AUTO: 169 K/UL — SIGNIFICANT CHANGE UP (ref 150–400)
POTASSIUM SERPL-MCNC: 4.1 MMOL/L — SIGNIFICANT CHANGE UP (ref 3.5–5.3)
POTASSIUM SERPL-SCNC: 4.1 MMOL/L — SIGNIFICANT CHANGE UP (ref 3.5–5.3)
RBC # BLD: 4.31 M/UL — SIGNIFICANT CHANGE UP (ref 3.8–5.2)
RBC # FLD: 11.8 % — SIGNIFICANT CHANGE UP (ref 10.3–14.5)
SODIUM SERPL-SCNC: 140 MMOL/L — SIGNIFICANT CHANGE UP (ref 135–145)
WBC # BLD: 16.3 K/UL — HIGH (ref 3.8–10.5)
WBC # FLD AUTO: 16.3 K/UL — HIGH (ref 3.8–10.5)

## 2018-02-07 PROCEDURE — 99233 SBSQ HOSP IP/OBS HIGH 50: CPT

## 2018-02-07 RX ORDER — OXYCODONE HYDROCHLORIDE 5 MG/1
1 TABLET ORAL
Qty: 56 | Refills: 0 | OUTPATIENT
Start: 2018-02-07 | End: 2018-02-13

## 2018-02-07 RX ADMIN — MAGNESIUM OXIDE 400 MG ORAL TABLET 200 MILLIGRAM(S): 241.3 TABLET ORAL at 21:41

## 2018-02-07 RX ADMIN — SODIUM CHLORIDE 100 MILLILITER(S): 9 INJECTION, SOLUTION INTRAVENOUS at 01:33

## 2018-02-07 RX ADMIN — Medication 1000 MILLIGRAM(S): at 13:45

## 2018-02-07 RX ADMIN — Medication 1000 MILLIGRAM(S): at 21:42

## 2018-02-07 RX ADMIN — CELECOXIB 200 MILLIGRAM(S): 200 CAPSULE ORAL at 08:11

## 2018-02-07 RX ADMIN — Medication 162 MILLIGRAM(S): at 09:02

## 2018-02-07 RX ADMIN — Medication 100 MILLIGRAM(S): at 13:10

## 2018-02-07 RX ADMIN — Medication 1000 MILLIGRAM(S): at 21:40

## 2018-02-07 RX ADMIN — OXYCODONE HYDROCHLORIDE 5 MILLIGRAM(S): 5 TABLET ORAL at 11:00

## 2018-02-07 RX ADMIN — Medication 1000 MILLIGRAM(S): at 07:00

## 2018-02-07 RX ADMIN — Medication 162 MILLIGRAM(S): at 21:41

## 2018-02-07 RX ADMIN — SENNA PLUS 2 TABLET(S): 8.6 TABLET ORAL at 21:40

## 2018-02-07 RX ADMIN — CELECOXIB 200 MILLIGRAM(S): 200 CAPSULE ORAL at 17:07

## 2018-02-07 RX ADMIN — Medication 500 MILLIGRAM(S): at 21:42

## 2018-02-07 RX ADMIN — CELECOXIB 200 MILLIGRAM(S): 200 CAPSULE ORAL at 18:25

## 2018-02-07 RX ADMIN — PANTOPRAZOLE SODIUM 40 MILLIGRAM(S): 20 TABLET, DELAYED RELEASE ORAL at 05:10

## 2018-02-07 RX ADMIN — Medication 400 MILLIGRAM(S): at 01:33

## 2018-02-07 RX ADMIN — OXYCODONE HYDROCHLORIDE 5 MILLIGRAM(S): 5 TABLET ORAL at 10:27

## 2018-02-07 RX ADMIN — OXYCODONE HYDROCHLORIDE 5 MILLIGRAM(S): 5 TABLET ORAL at 05:10

## 2018-02-07 RX ADMIN — OXYCODONE HYDROCHLORIDE 5 MILLIGRAM(S): 5 TABLET ORAL at 06:00

## 2018-02-07 RX ADMIN — OXYCODONE HYDROCHLORIDE 5 MILLIGRAM(S): 5 TABLET ORAL at 23:47

## 2018-02-07 RX ADMIN — Medication 400 MILLIGRAM(S): at 06:31

## 2018-02-07 RX ADMIN — Medication 100 MILLIGRAM(S): at 21:40

## 2018-02-07 RX ADMIN — CELECOXIB 200 MILLIGRAM(S): 200 CAPSULE ORAL at 08:59

## 2018-02-07 RX ADMIN — Medication 1000 MILLIGRAM(S): at 01:50

## 2018-02-07 RX ADMIN — Medication 1000 MILLIGRAM(S): at 13:09

## 2018-02-07 RX ADMIN — AMLODIPINE BESYLATE 10 MILLIGRAM(S): 2.5 TABLET ORAL at 05:10

## 2018-02-07 RX ADMIN — Medication 100 MILLIGRAM(S): at 05:10

## 2018-02-07 NOTE — OCCUPATIONAL THERAPY INITIAL EVALUATION ADULT - ADDITIONAL COMMENTS
4 MARIA TERESA no HR (brick ledge to lean on). no steps inside 4 MARIA TERESA no HR (brick ledge to lean on). no steps inside. + tub with curtain & suction cup GB. + RTS (pt states does not fit correctly). + SAC

## 2018-02-08 LAB
ANION GAP SERPL CALC-SCNC: 7 MMOL/L — SIGNIFICANT CHANGE UP (ref 5–17)
BUN SERPL-MCNC: 23 MG/DL — SIGNIFICANT CHANGE UP (ref 7–23)
CALCIUM SERPL-MCNC: 8.4 MG/DL — SIGNIFICANT CHANGE UP (ref 8.4–10.5)
CHLORIDE SERPL-SCNC: 107 MMOL/L — SIGNIFICANT CHANGE UP (ref 96–108)
CO2 SERPL-SCNC: 28 MMOL/L — SIGNIFICANT CHANGE UP (ref 22–31)
CREAT SERPL-MCNC: 0.88 MG/DL — SIGNIFICANT CHANGE UP (ref 0.5–1.3)
GLUCOSE SERPL-MCNC: 112 MG/DL — HIGH (ref 70–99)
HCT VFR BLD CALC: 35 % — SIGNIFICANT CHANGE UP (ref 34.5–45)
HGB BLD-MCNC: 12.1 G/DL — SIGNIFICANT CHANGE UP (ref 11.5–15.5)
MCHC RBC-ENTMCNC: 30.2 PG — SIGNIFICANT CHANGE UP (ref 27–34)
MCHC RBC-ENTMCNC: 34.5 GM/DL — SIGNIFICANT CHANGE UP (ref 32–36)
MCV RBC AUTO: 87.4 FL — SIGNIFICANT CHANGE UP (ref 80–100)
PLATELET # BLD AUTO: 132 K/UL — LOW (ref 150–400)
POTASSIUM SERPL-MCNC: 4 MMOL/L — SIGNIFICANT CHANGE UP (ref 3.5–5.3)
POTASSIUM SERPL-SCNC: 4 MMOL/L — SIGNIFICANT CHANGE UP (ref 3.5–5.3)
RBC # BLD: 4 M/UL — SIGNIFICANT CHANGE UP (ref 3.8–5.2)
RBC # FLD: 12.2 % — SIGNIFICANT CHANGE UP (ref 10.3–14.5)
SODIUM SERPL-SCNC: 142 MMOL/L — SIGNIFICANT CHANGE UP (ref 135–145)
WBC # BLD: 10.8 K/UL — HIGH (ref 3.8–10.5)
WBC # FLD AUTO: 10.8 K/UL — HIGH (ref 3.8–10.5)

## 2018-02-08 PROCEDURE — 99233 SBSQ HOSP IP/OBS HIGH 50: CPT

## 2018-02-08 RX ADMIN — OXYCODONE HYDROCHLORIDE 5 MILLIGRAM(S): 5 TABLET ORAL at 08:39

## 2018-02-08 RX ADMIN — Medication 162 MILLIGRAM(S): at 21:14

## 2018-02-08 RX ADMIN — CELECOXIB 200 MILLIGRAM(S): 200 CAPSULE ORAL at 18:38

## 2018-02-08 RX ADMIN — OXYCODONE HYDROCHLORIDE 5 MILLIGRAM(S): 5 TABLET ORAL at 00:17

## 2018-02-08 RX ADMIN — Medication 100 MILLIGRAM(S): at 14:49

## 2018-02-08 RX ADMIN — Medication 1000 MILLIGRAM(S): at 05:49

## 2018-02-08 RX ADMIN — AMLODIPINE BESYLATE 10 MILLIGRAM(S): 2.5 TABLET ORAL at 05:49

## 2018-02-08 RX ADMIN — Medication 500 MILLIGRAM(S): at 21:14

## 2018-02-08 RX ADMIN — PANTOPRAZOLE SODIUM 40 MILLIGRAM(S): 20 TABLET, DELAYED RELEASE ORAL at 05:49

## 2018-02-08 RX ADMIN — CELECOXIB 200 MILLIGRAM(S): 200 CAPSULE ORAL at 08:38

## 2018-02-08 RX ADMIN — Medication 162 MILLIGRAM(S): at 09:28

## 2018-02-08 RX ADMIN — Medication 100 MILLIGRAM(S): at 05:49

## 2018-02-08 RX ADMIN — Medication 100 MILLIGRAM(S): at 21:14

## 2018-02-08 RX ADMIN — LOSARTAN POTASSIUM 100 MILLIGRAM(S): 100 TABLET, FILM COATED ORAL at 05:49

## 2018-02-08 RX ADMIN — Medication 1000 MILLIGRAM(S): at 14:48

## 2018-02-08 RX ADMIN — Medication 1000 MILLIGRAM(S): at 15:18

## 2018-02-08 RX ADMIN — CELECOXIB 200 MILLIGRAM(S): 200 CAPSULE ORAL at 09:08

## 2018-02-08 RX ADMIN — MAGNESIUM OXIDE 400 MG ORAL TABLET 200 MILLIGRAM(S): 241.3 TABLET ORAL at 21:15

## 2018-02-08 RX ADMIN — SENNA PLUS 2 TABLET(S): 8.6 TABLET ORAL at 21:15

## 2018-02-08 RX ADMIN — OXYCODONE HYDROCHLORIDE 5 MILLIGRAM(S): 5 TABLET ORAL at 09:09

## 2018-02-08 RX ADMIN — OXYCODONE HYDROCHLORIDE 5 MILLIGRAM(S): 5 TABLET ORAL at 23:33

## 2018-02-08 RX ADMIN — Medication 1000 MILLIGRAM(S): at 21:13

## 2018-02-08 RX ADMIN — Medication 1000 MILLIGRAM(S): at 05:51

## 2018-02-09 VITALS
OXYGEN SATURATION: 93 % | TEMPERATURE: 98 F | HEART RATE: 60 BPM | DIASTOLIC BLOOD PRESSURE: 71 MMHG | RESPIRATION RATE: 18 BRPM | SYSTOLIC BLOOD PRESSURE: 147 MMHG

## 2018-02-09 LAB
ANION GAP SERPL CALC-SCNC: 7 MMOL/L — SIGNIFICANT CHANGE UP (ref 5–17)
BUN SERPL-MCNC: 22 MG/DL — SIGNIFICANT CHANGE UP (ref 7–23)
CALCIUM SERPL-MCNC: 8.5 MG/DL — SIGNIFICANT CHANGE UP (ref 8.4–10.5)
CHLORIDE SERPL-SCNC: 106 MMOL/L — SIGNIFICANT CHANGE UP (ref 96–108)
CO2 SERPL-SCNC: 28 MMOL/L — SIGNIFICANT CHANGE UP (ref 22–31)
CREAT SERPL-MCNC: 0.8 MG/DL — SIGNIFICANT CHANGE UP (ref 0.5–1.3)
GLUCOSE SERPL-MCNC: 111 MG/DL — HIGH (ref 70–99)
HCT VFR BLD CALC: 36.4 % — SIGNIFICANT CHANGE UP (ref 34.5–45)
HGB BLD-MCNC: 12.1 G/DL — SIGNIFICANT CHANGE UP (ref 11.5–15.5)
MCHC RBC-ENTMCNC: 28.8 PG — SIGNIFICANT CHANGE UP (ref 27–34)
MCHC RBC-ENTMCNC: 33.3 GM/DL — SIGNIFICANT CHANGE UP (ref 32–36)
MCV RBC AUTO: 86.6 FL — SIGNIFICANT CHANGE UP (ref 80–100)
PLATELET # BLD AUTO: 140 K/UL — LOW (ref 150–400)
POTASSIUM SERPL-MCNC: 3.9 MMOL/L — SIGNIFICANT CHANGE UP (ref 3.5–5.3)
POTASSIUM SERPL-SCNC: 3.9 MMOL/L — SIGNIFICANT CHANGE UP (ref 3.5–5.3)
RBC # BLD: 4.2 M/UL — SIGNIFICANT CHANGE UP (ref 3.8–5.2)
RBC # FLD: 12 % — SIGNIFICANT CHANGE UP (ref 10.3–14.5)
SODIUM SERPL-SCNC: 141 MMOL/L — SIGNIFICANT CHANGE UP (ref 135–145)
WBC # BLD: 8.8 K/UL — SIGNIFICANT CHANGE UP (ref 3.8–10.5)
WBC # FLD AUTO: 8.8 K/UL — SIGNIFICANT CHANGE UP (ref 3.8–10.5)

## 2018-02-09 PROCEDURE — 88311 DECALCIFY TISSUE: CPT

## 2018-02-09 PROCEDURE — 94664 DEMO&/EVAL PT USE INHALER: CPT

## 2018-02-09 PROCEDURE — 85027 COMPLETE CBC AUTOMATED: CPT

## 2018-02-09 PROCEDURE — 97535 SELF CARE MNGMENT TRAINING: CPT

## 2018-02-09 PROCEDURE — 88305 TISSUE EXAM BY PATHOLOGIST: CPT

## 2018-02-09 PROCEDURE — C1713: CPT

## 2018-02-09 PROCEDURE — C1889: CPT

## 2018-02-09 PROCEDURE — 97110 THERAPEUTIC EXERCISES: CPT

## 2018-02-09 PROCEDURE — 97165 OT EVAL LOW COMPLEX 30 MIN: CPT

## 2018-02-09 PROCEDURE — 97530 THERAPEUTIC ACTIVITIES: CPT

## 2018-02-09 PROCEDURE — 97116 GAIT TRAINING THERAPY: CPT

## 2018-02-09 PROCEDURE — 99233 SBSQ HOSP IP/OBS HIGH 50: CPT

## 2018-02-09 PROCEDURE — C1776: CPT

## 2018-02-09 PROCEDURE — 80048 BASIC METABOLIC PNL TOTAL CA: CPT

## 2018-02-09 PROCEDURE — 73562 X-RAY EXAM OF KNEE 3: CPT

## 2018-02-09 PROCEDURE — 97161 PT EVAL LOW COMPLEX 20 MIN: CPT

## 2018-02-09 RX ADMIN — Medication 1000 MILLIGRAM(S): at 00:00

## 2018-02-09 RX ADMIN — CELECOXIB 200 MILLIGRAM(S): 200 CAPSULE ORAL at 16:11

## 2018-02-09 RX ADMIN — AMLODIPINE BESYLATE 10 MILLIGRAM(S): 2.5 TABLET ORAL at 12:27

## 2018-02-09 RX ADMIN — CELECOXIB 200 MILLIGRAM(S): 200 CAPSULE ORAL at 08:10

## 2018-02-09 RX ADMIN — Medication 100 MILLIGRAM(S): at 06:14

## 2018-02-09 RX ADMIN — PANTOPRAZOLE SODIUM 40 MILLIGRAM(S): 20 TABLET, DELAYED RELEASE ORAL at 06:14

## 2018-02-09 RX ADMIN — CELECOXIB 200 MILLIGRAM(S): 200 CAPSULE ORAL at 16:08

## 2018-02-09 RX ADMIN — OXYCODONE HYDROCHLORIDE 5 MILLIGRAM(S): 5 TABLET ORAL at 00:03

## 2018-02-09 RX ADMIN — Medication 1000 MILLIGRAM(S): at 07:39

## 2018-02-09 RX ADMIN — CELECOXIB 200 MILLIGRAM(S): 200 CAPSULE ORAL at 08:40

## 2018-02-09 RX ADMIN — Medication 100 MILLIGRAM(S): at 13:20

## 2018-02-09 RX ADMIN — Medication 1000 MILLIGRAM(S): at 06:14

## 2018-02-09 RX ADMIN — OXYCODONE HYDROCHLORIDE 5 MILLIGRAM(S): 5 TABLET ORAL at 09:20

## 2018-02-09 RX ADMIN — LOSARTAN POTASSIUM 100 MILLIGRAM(S): 100 TABLET, FILM COATED ORAL at 12:27

## 2018-02-09 RX ADMIN — OXYCODONE HYDROCHLORIDE 5 MILLIGRAM(S): 5 TABLET ORAL at 08:41

## 2018-02-09 RX ADMIN — Medication 1000 MILLIGRAM(S): at 13:50

## 2018-02-09 RX ADMIN — Medication 1000 MILLIGRAM(S): at 13:20

## 2018-02-09 RX ADMIN — Medication 162 MILLIGRAM(S): at 09:30

## 2018-02-09 NOTE — PROGRESS NOTE ADULT - PROVIDER SPECIALTY LIST ADULT
Hospitalist
Orthopedics
Pharmacy
Orthopedics
Hospitalist
Hospitalist

## 2018-02-09 NOTE — PROGRESS NOTE ADULT - PROBLEM SELECTOR PROBLEM 5
Primary osteoarthritis of left knee

## 2018-02-09 NOTE — PROGRESS NOTE ADULT - SUBJECTIVE AND OBJECTIVE BOX
Orthopaedic Post Op Note    Procedure:  Left TKR  Surgeon: Earl Castellon MD    68y Female comfortable, without complaints. Reported pain score = 0  Denies N/V, CP, SOB, numbness/tingling of extremities.    PE:  Vital Signs Last 24 Hrs  T(C): 36.7 (06 Feb 2018 16:15), Max: 36.9 (06 Feb 2018 09:43)  T(F): 98 (06 Feb 2018 16:15), Max: 98.4 (06 Feb 2018 09:43)  HR: 74 (06 Feb 2018 16:15) (60 - 84)  BP: 142/61 (06 Feb 2018 16:15) (98/57 - 179/73)  RR: 16 (06 Feb 2018 16:15) (12 - 19)  SpO2: 96% (06 Feb 2018 16:15) (95% - 97%)  General: Pt alert and oriented   Lungs: + BS CTA bilaterally  Heart: +S1 & S2 heard, RRR  Abd: + BS heard, soft, NT, ND  Left Knee Dressing: C/D/I, functioning hemovac drain in place  Bilateral LEs:  Motor:   5/5 dorsiflexion, plantarflexion, EHL  Sensation intact to LT   1+ DP Pulses    A/P: 68y Female POD#0 s/p Left TKR  - Stable  - Acetaminophen, Celebrex, Dilaudid/Oxycodone for Pain Control   - DVT ppx: Aspirin  - Magy op IV abx: Ancef  - PT, OT per protocol  - F/U AM Labs  DCP = home Thurs
Patient is a 68y old  Female who presents with a chief complaint of I have left knee pain (06 Feb 2018 18:38)      INTERVAL HPI/OVERNIGHT EVENTS:  Pt is seen and examined.  pain is controlled. feels better.    Pain Location & Control:     MEDICATIONS  (STANDING):  acetaminophen   Tablet. 1000 milliGRAM(s) Oral every 8 hours  amLODIPine   Tablet 10 milliGRAM(s) Oral daily  aspirin enteric coated 162 milliGRAM(s) Oral every 12 hours  celecoxib 200 milliGRAM(s) Oral two times a day with meals  docusate sodium 100 milliGRAM(s) Oral three times a day  losartan 100 milliGRAM(s) Oral daily  magnesium oxide 200 milliGRAM(s) Oral at bedtime  niacin  milliGRAM(s) Oral at bedtime  pantoprazole    Tablet 40 milliGRAM(s) Oral before breakfast  senna 2 Tablet(s) Oral at bedtime    MEDICATIONS  (PRN):  aluminum hydroxide/magnesium hydroxide/simethicone Suspension 30 milliLiter(s) Oral four times a day PRN Indigestion  bisacodyl Suppository 10 milliGRAM(s) Rectal daily PRN If no bowel movement by postoperative day #2  HYDROmorphone  Injectable 0.5 milliGRAM(s) IV Push every 3 hours PRN Severe Pain (7 - 10)  magnesium hydroxide Suspension 30 milliLiter(s) Oral daily PRN Constipation  ondansetron Injectable 4 milliGRAM(s) IV Push every 6 hours PRN Nausea and/or Vomiting  oxyCODONE    IR 5 milliGRAM(s) Oral every 3 hours PRN Mild Pain (1 - 3)  oxyCODONE    IR 10 milliGRAM(s) Oral every 3 hours PRN Moderate Pain (4 - 6)  polyethylene glycol 3350 17 Gram(s) Oral daily PRN Constipation      Allergies    No Known Allergies    Intolerances            Vital Signs Last 24 Hrs  T(C): 36.5 (08 Feb 2018 07:25), Max: 36.8 (07 Feb 2018 23:04)  T(F): 97.7 (08 Feb 2018 07:25), Max: 98.2 (07 Feb 2018 23:04)  HR: 51 (08 Feb 2018 07:25) (51 - 72)  BP: 129/61 (08 Feb 2018 07:25) (121/67 - 156/66)  BP(mean): --  RR: 18 (08 Feb 2018 07:25) (14 - 18)  SpO2: 91% (08 Feb 2018 07:25) (90% - 94%)        LABS:                        12.1   10.8  )-----------( 132      ( 08 Feb 2018 07:22 )             35.0     08 Feb 2018 07:22    142    |  107    |  23     ----------------------------<  112    4.0     |  28     |  0.88     Ca    8.4        08 Feb 2018 07:22            RADIOLOGY & ADDITIONAL TESTS:    Imaging Personally Reviewed:  [ ] YES  [ ] NO    Consultant(s) Notes Reviewed:  [ ] YES  [ ] NO    Care Discussed with Consultants/Other Providers [x ] YES  [ ] NO
Discharge medication calendar:  ASA EC 162mg q12h x 6 weeks  APAP 1000mg q8h x 2-3 weeks  Celecoxib 200mg q12h x 2-3 weeks  Pantoprazole 40mg QAM x 6 weeks  Narcotic PRN  Docusate 100mg TID while taking narcotic  Senna, bisacodyl, or Miralax PRN for treatment of constipation
ORTHOPEDIC ATTENDING PROGRESS NOTE  STEPHIE DAVIS      68y Female                                                                                                                               POD #    1    STATUS POST:               Pre-Op Dx: Primary localized osteoarthritis of left knee    Post-Op Dx:  Primary localized osteoarthritis of left knee    Procedure: Knee replacement: left                                                Pain (0-10):  Pt reports  Current Pain Management:  [ ] PCA   [x] Po Analgesics [ ] IM /IV Anagesics     T(F): 97.7  HR: 56  BP: 115/53  RR: 16  SpO2: 91%               Physical Exam :    -   Dressing changed sterile.   -   Wound C/D/I.   -   Distal Neurvascular status intact grossly.   -   Warm well perfused; capillary refill <3 seconds   -   (+)EHL/FHL   -   (+) Sensation to light touch  -   (-) Calf tenderness Bilaterally    A/P: 68y Female s/p Knee replacement: left     -   Ortho Stable  -   Pain control   -   Medicine to follow  -   DVT ppx:     [ ]SCDs     [x ] ASA     [ ] Eliquis     [ ] Lovenox  -   Weight bearing status:  WBAT [x ]        PWB    [ ]     TTWB  [ ]      NWB  [ ]   -  Dispo:     Home [x ]     Acute Rehab [ ]     [facility] [ ]     TBD [ ]
ORTHOPEDIC ATTENDING PROGRESS NOTE  STEPHIE DAVIS      68y Female                                                                                                                               POD #  2      STATUS POST:               Pre-Op Dx: Primary localized osteoarthritis of left knee    Post-Op Dx:  Primary localized osteoarthritis of left knee    Procedure: Knee replacement: left                                                Pain (0-10):  Pt reports  Current Pain Management:  [ ] PCA   [x ] Po Analgesics [ ] IM /IV Anagesics     T(F): 97.9  HR: 52  BP: 125/74  RR: 16  SpO2: 94%                         12.1   10.8  )-----------( 132      ( 08 Feb 2018 07:22 )             35.0               Physical Exam :    -   Dressing changed sterile.   -   Wound C/D/I.   -   Distal Neurvascular status intact grossly.   -   Warm well perfused; capillary refill <3 seconds   -   (+)EHL/FHL   -   (+) Sensation to light touch  -   (-) Calf tenderness Bilaterally    A/P: 68y Female s/p Knee replacement: left     -   Ortho Stable  -   Pain control   -   Medicine to follow  -   DVT ppx:     [ ]SCDs     [x ] ASA     [ ] Eliquis     [ ] Lovenox  -   Weight bearing status:  WBAT [x ]        PWB    [ ]     TTWB  [ ]      NWB  [ ]   -  Dispo:     Home [ x]     Acute Rehab [ ]     [facility] [ ]     TBD [ ]
ORTHOPEDIC PA PROGRESS NOTE  STEPHIE DAVIS      68y Female                                                                                                                               POD #    1    STATUS POST:               Pre-Op Dx: Primary localized osteoarthritis of left knee    Post-Op Dx:  Primary localized osteoarthritis of left knee    Procedure: Knee replacement: left                                                Pain (0-10):  Pt reports no pain controlled. Denies any CP, SOB, fever, chills, numbness/tingling. Pt is positive void,  Current Pain Management:  [x ] Po Analgesics [ x] IM /IV Analgesics     T(F): 97.7  HR: 56  BP: 115/53  RR: 16  SpO2: 91%               Physical Exam :    -   Dressing C/D/I.   -  Hemovac x 1 intact  -   Distal Neurvascular status intact grossly.   -   Warm well perfused; capillary refill <3 seconds   -   (+)EHL/FHL   -   (+) Sensation to light touch  -   (-) Calf tenderness Bilaterally    A/P: 68y Female s/p Knee replacement: left     -   Continue PT/OT  -  Continue CPM machine  -   Pain control   -   Medicine to follow  -   DVT ppx:     [x ]SCDs     [x ] ASA      -   Weight bearing status:  WBAT [x ]           -  Dispo:     Home [x ]     when medically and PT cleared
POD#:    S/P: Left  TKR                       SUBJECTIVE: Patient seen and examined.  Feeling well.  Tolerating PT.   Reported Pain Score = 3    OBJECTIVE:     Vital Signs Last 24 Hrs  T(C): 36.5 (09 Feb 2018 11:15), Max: 36.9 (09 Feb 2018 07:15)  T(F): 97.7 (09 Feb 2018 11:15), Max: 98.5 (09 Feb 2018 07:15)  HR: 58 (09 Feb 2018 11:15) (56 - 58)  BP: 151/70 (09 Feb 2018 11:15) (143/57 - 151/70)  RR: 16 (09 Feb 2018 11:15) (16 - 16)  SpO2: 94% (09 Feb 2018 11:15) (92% - 94%)    Left Knee:          Dressing removed: incision clean/dry/intact.  Prineo tape in place.  Scant sanguinous d/c at drain site.  Bilateral LEs:         Sensation:  intact to light touch          Motor exam:  5/5 dorsiflexion/plantarflexion/EHL          2+ DP pulses          calf supple, NT    LABS:                        12.1   8.8   )-----------( 140      ( 09 Feb 2018 08:30 )             36.4     02-09    141  |  106  |  22  ----------------------------<  111<H>  3.9   |  28  |  0.80    Ca    8.5      09 Feb 2018 08:30            MEDICATIONS:  Anticoagulation:  aspirin enteric coated 162 milliGRAM(s) Oral every 12 hours      Pain medications:   acetaminophen   Tablet. 1000 milliGRAM(s) Oral every 8 hours  celecoxib 200 milliGRAM(s) Oral two times a day with meals  HYDROmorphone  Injectable 0.5 milliGRAM(s) IV Push every 3 hours PRN  oxyCODONE    IR 5 milliGRAM(s) Oral every 3 hours PRN  oxyCODONE    IR 10 milliGRAM(s) Oral every 3 hours PRN        A/P : Patient stable s/p Left  TKR   POD # 2  -    dressing removed, new dressing applied over drain site.   -    Pain control  -    DVT ppx: aspirin  -    Weight bearing status: WBAT   -    Physical Therapy  -    Occupational Therapy  -    Discharge plan: home today
Patient is a 68y old  Female who presents with a chief complaint of I have left knee pain; s/p elective left total knee replacement (06 Feb 2018 18:38)      INTERVAL HPI/OVERNIGHT EVENTS:  Pt is seen and examined.  pain is controlled.    Pain Location & Control:     MEDICATIONS  (STANDING):  acetaminophen   Tablet. 1000 milliGRAM(s) Oral every 8 hours  amLODIPine   Tablet 10 milliGRAM(s) Oral daily  aspirin enteric coated 162 milliGRAM(s) Oral every 12 hours  celecoxib 200 milliGRAM(s) Oral two times a day with meals  docusate sodium 100 milliGRAM(s) Oral three times a day  losartan 100 milliGRAM(s) Oral daily  magnesium oxide 200 milliGRAM(s) Oral at bedtime  niacin  milliGRAM(s) Oral at bedtime  pantoprazole    Tablet 40 milliGRAM(s) Oral before breakfast  senna 2 Tablet(s) Oral at bedtime    MEDICATIONS  (PRN):  aluminum hydroxide/magnesium hydroxide/simethicone Suspension 30 milliLiter(s) Oral four times a day PRN Indigestion  bisacodyl Suppository 10 milliGRAM(s) Rectal daily PRN If no bowel movement by postoperative day #2  HYDROmorphone  Injectable 0.5 milliGRAM(s) IV Push every 3 hours PRN Severe Pain (7 - 10)  magnesium hydroxide Suspension 30 milliLiter(s) Oral daily PRN Constipation  ondansetron Injectable 4 milliGRAM(s) IV Push every 6 hours PRN Nausea and/or Vomiting  oxyCODONE    IR 5 milliGRAM(s) Oral every 3 hours PRN Mild Pain (1 - 3)  oxyCODONE    IR 10 milliGRAM(s) Oral every 3 hours PRN Moderate Pain (4 - 6)  polyethylene glycol 3350 17 Gram(s) Oral daily PRN Constipation      Allergies    No Known Allergies    Intolerances            Vital Signs Last 24 Hrs  T(C): 36.9 (09 Feb 2018 07:15), Max: 36.9 (09 Feb 2018 07:15)  T(F): 98.5 (09 Feb 2018 07:15), Max: 98.5 (09 Feb 2018 07:15)  HR: 57 (09 Feb 2018 07:15) (54 - 58)  BP: 148/66 (09 Feb 2018 07:15) (130/69 - 148/66)  BP(mean): --  RR: 16 (09 Feb 2018 07:15) (16 - 20)  SpO2: 92% (09 Feb 2018 07:15) (91% - 94%)        LABS:                        12.1   8.8   )-----------( 140      ( 09 Feb 2018 08:30 )             36.4     09 Feb 2018 08:30    141    |  106    |  22     ----------------------------<  111    3.9     |  28     |  0.80     Ca    8.5        09 Feb 2018 08:30            RADIOLOGY & ADDITIONAL TESTS:    Imaging Personally Reviewed:  [ ] YES  [ ] NO    Consultant(s) Notes Reviewed:  [ ] YES  [ ] NO    Care Discussed with Consultants/Other Providers [ x] YES  [ ] NO
Patient is a 68y old  Female who presents with a chief complaint of I have left knee pain (06 Feb 2018 18:38)      INTERVAL HPI/OVERNIGHT EVENTS:  Pt is seen and examined.  s/p left TKR.  pain is controlled.  sitting on chair..  Pain Location & Control:     MEDICATIONS  (STANDING):  acetaminophen   Tablet. 1000 milliGRAM(s) Oral every 8 hours  amLODIPine   Tablet 10 milliGRAM(s) Oral daily  aspirin enteric coated 162 milliGRAM(s) Oral every 12 hours  celecoxib 200 milliGRAM(s) Oral two times a day with meals  docusate sodium 100 milliGRAM(s) Oral three times a day  magnesium oxide 200 milliGRAM(s) Oral at bedtime  niacin  milliGRAM(s) Oral at bedtime  pantoprazole    Tablet 40 milliGRAM(s) Oral before breakfast  senna 2 Tablet(s) Oral at bedtime    MEDICATIONS  (PRN):  aluminum hydroxide/magnesium hydroxide/simethicone Suspension 30 milliLiter(s) Oral four times a day PRN Indigestion  HYDROmorphone  Injectable 0.5 milliGRAM(s) IV Push every 3 hours PRN Severe Pain (7 - 10)  magnesium hydroxide Suspension 30 milliLiter(s) Oral daily PRN Constipation  ondansetron Injectable 4 milliGRAM(s) IV Push every 6 hours PRN Nausea and/or Vomiting  oxyCODONE    IR 5 milliGRAM(s) Oral every 3 hours PRN Mild Pain (1 - 3)  oxyCODONE    IR 10 milliGRAM(s) Oral every 3 hours PRN Moderate Pain (4 - 6)  polyethylene glycol 3350 17 Gram(s) Oral daily PRN Constipation      Allergies    No Known Allergies    Intolerances            Vital Signs Last 24 Hrs  T(C): 36.5 (07 Feb 2018 07:30), Max: 36.9 (06 Feb 2018 14:35)  T(F): 97.7 (07 Feb 2018 07:30), Max: 98.4 (06 Feb 2018 14:35)  HR: 56 (07 Feb 2018 07:30) (56 - 80)  BP: 115/53 (07 Feb 2018 07:30) (98/57 - 163/85)  BP(mean): --  RR: 16 (07 Feb 2018 07:30) (12 - 19)  SpO2: 91% (07 Feb 2018 07:30) (91% - 98%)        LABS:                        12.6   16.3  )-----------( 169      ( 07 Feb 2018 07:48 )             37.0     07 Feb 2018 07:48    140    |  103    |  17     ----------------------------<  150    4.1     |  26     |  0.81     Ca    8.7        07 Feb 2018 07:48              RADIOLOGY & ADDITIONAL TESTS:    Imaging Personally Reviewed:  [ ] YES  [ ] NO    Consultant(s) Notes Reviewed:  [ ] YES  [ ] NO    Care Discussed with Consultants/Other Providers [ x] YES  [ ] NO

## 2018-02-09 NOTE — PROGRESS NOTE ADULT - PROBLEM SELECTOR PLAN 7
pt is asymptomatics.  likely reactive.  check cbc in am.
pt is asymptomatics.  likely reactive. Improving.  check cbc in am.
resolved.

## 2018-02-09 NOTE — PROGRESS NOTE ADULT - PROBLEM SELECTOR PROBLEM 6
Obesity (BMI 35.0-39.9 without comorbidity)

## 2018-02-09 NOTE — PROGRESS NOTE ADULT - PROBLEM SELECTOR PLAN 1
pain meds -oxycodone. Monitor for lethargy and respiratory depression.  PT/OT.  DVT prophylaxis.  DVT ppx: [ ]ASA81 [ x] [id number] [ ] Lovenox [ ] Coumadin   [ ] Eliquis [  ] Heparin  Dispo:     Home [ x]     Acute Rehab [ ]     [facility] [ ]     TBD [ ].
pain meds -oxycodone. Monitor for lethargy and respiratory depression.  PT/OT.  DVT prophylaxis.  DVT ppx: [ ]ASA81 [ x] [id number] [ ] Lovenox [ ] Coumadin   [ ] Eliquis [  ] Heparin  Dispo:     Home [ x]     Acute Rehab [ ]     [facility] [ ]     TBD [ ].
pain meds -oxycodone. Monitor for lethargy and respiratory depression.  PT/OT.  DVT prophylaxis.  DVT ppx: [ ]ASA81 [ x] COL881 [ ] Lovenox [ ] Coumadin   [ ] Eliquis [  ] Heparin  Dispo:     Home [ x]     Acute Rehab [ ]     [facility] [ ]     TBD [ ].

## 2018-02-09 NOTE — PROGRESS NOTE ADULT - PROBLEM SELECTOR PLAN 3
norvasc and cozaar with parameter

## 2018-02-12 ENCOUNTER — OTHER (OUTPATIENT)
Age: 68
End: 2018-02-12

## 2018-04-16 ENCOUNTER — APPOINTMENT (OUTPATIENT)
Dept: MRI IMAGING | Facility: CLINIC | Age: 68
End: 2018-04-16
Payer: MEDICARE

## 2018-04-16 ENCOUNTER — OUTPATIENT (OUTPATIENT)
Dept: OUTPATIENT SERVICES | Facility: HOSPITAL | Age: 68
LOS: 1 days | End: 2018-04-16
Payer: MEDICARE

## 2018-04-16 DIAGNOSIS — Z00.8 ENCOUNTER FOR OTHER GENERAL EXAMINATION: ICD-10-CM

## 2018-04-16 PROCEDURE — 73221 MRI JOINT UPR EXTREM W/O DYE: CPT

## 2018-04-16 PROCEDURE — 73221 MRI JOINT UPR EXTREM W/O DYE: CPT | Mod: 26,RT

## 2018-05-10 ENCOUNTER — OUTPATIENT (OUTPATIENT)
Dept: OUTPATIENT SERVICES | Facility: HOSPITAL | Age: 68
LOS: 1 days | End: 2018-05-10
Payer: MEDICARE

## 2018-05-10 VITALS
WEIGHT: 200.18 LBS | TEMPERATURE: 98 F | HEART RATE: 78 BPM | RESPIRATION RATE: 16 BRPM | DIASTOLIC BLOOD PRESSURE: 90 MMHG | HEIGHT: 62 IN | OXYGEN SATURATION: 96 % | SYSTOLIC BLOOD PRESSURE: 170 MMHG

## 2018-05-10 DIAGNOSIS — M17.12 UNILATERAL PRIMARY OSTEOARTHRITIS, LEFT KNEE: ICD-10-CM

## 2018-05-10 DIAGNOSIS — M25.662 STIFFNESS OF LEFT KNEE, NOT ELSEWHERE CLASSIFIED: ICD-10-CM

## 2018-05-10 DIAGNOSIS — Z96.652 PRESENCE OF LEFT ARTIFICIAL KNEE JOINT: Chronic | ICD-10-CM

## 2018-05-10 DIAGNOSIS — Z01.818 ENCOUNTER FOR OTHER PREPROCEDURAL EXAMINATION: ICD-10-CM

## 2018-05-10 DIAGNOSIS — M24.662 ANKYLOSIS, LEFT KNEE: ICD-10-CM

## 2018-05-10 LAB
ANION GAP SERPL CALC-SCNC: 7 MMOL/L — SIGNIFICANT CHANGE UP (ref 5–17)
BUN SERPL-MCNC: 18 MG/DL — SIGNIFICANT CHANGE UP (ref 7–23)
CALCIUM SERPL-MCNC: 9.4 MG/DL — SIGNIFICANT CHANGE UP (ref 8.4–10.5)
CHLORIDE SERPL-SCNC: 100 MMOL/L — SIGNIFICANT CHANGE UP (ref 96–108)
CO2 SERPL-SCNC: 29 MMOL/L — SIGNIFICANT CHANGE UP (ref 22–31)
CREAT SERPL-MCNC: 0.77 MG/DL — SIGNIFICANT CHANGE UP (ref 0.5–1.3)
GLUCOSE SERPL-MCNC: 102 MG/DL — HIGH (ref 70–99)
HCT VFR BLD CALC: 44.6 % — SIGNIFICANT CHANGE UP (ref 34.5–45)
HGB BLD-MCNC: 15.1 G/DL — SIGNIFICANT CHANGE UP (ref 11.5–15.5)
MCHC RBC-ENTMCNC: 28 PG — SIGNIFICANT CHANGE UP (ref 27–34)
MCHC RBC-ENTMCNC: 33.8 GM/DL — SIGNIFICANT CHANGE UP (ref 32–36)
MCV RBC AUTO: 82.9 FL — SIGNIFICANT CHANGE UP (ref 80–100)
PLATELET # BLD AUTO: 255 K/UL — SIGNIFICANT CHANGE UP (ref 150–400)
POTASSIUM SERPL-MCNC: 4.4 MMOL/L — SIGNIFICANT CHANGE UP (ref 3.5–5.3)
POTASSIUM SERPL-SCNC: 4.4 MMOL/L — SIGNIFICANT CHANGE UP (ref 3.5–5.3)
RBC # BLD: 5.38 M/UL — HIGH (ref 3.8–5.2)
RBC # FLD: 12.6 % — SIGNIFICANT CHANGE UP (ref 10.3–14.5)
SODIUM SERPL-SCNC: 136 MMOL/L — SIGNIFICANT CHANGE UP (ref 135–145)
WBC # BLD: 10.6 K/UL — HIGH (ref 3.8–10.5)
WBC # FLD AUTO: 10.6 K/UL — HIGH (ref 3.8–10.5)

## 2018-05-10 PROCEDURE — 80048 BASIC METABOLIC PNL TOTAL CA: CPT

## 2018-05-10 PROCEDURE — 85027 COMPLETE CBC AUTOMATED: CPT

## 2018-05-10 PROCEDURE — G0463: CPT

## 2018-05-10 RX ORDER — FAMOTIDINE 10 MG/ML
20 INJECTION INTRAVENOUS DAILY
Qty: 0 | Refills: 0 | Status: DISCONTINUED | OUTPATIENT
Start: 2018-05-10 | End: 2018-05-25

## 2018-05-10 RX ORDER — NIACIN 50 MG
1 TABLET ORAL
Qty: 0 | Refills: 0 | COMMUNITY

## 2018-05-10 RX ORDER — CHOLECALCIFEROL (VITAMIN D3) 125 MCG
1 CAPSULE ORAL
Qty: 0 | Refills: 0 | COMMUNITY

## 2018-05-10 RX ORDER — MAGNESIUM CARBONATE 54 MG/5 ML
1 LIQUID (ML) ORAL
Qty: 0 | Refills: 0 | COMMUNITY

## 2018-05-10 RX ORDER — CHLORHEXIDINE GLUCONATE 213 G/1000ML
1 SOLUTION TOPICAL ONCE
Qty: 0 | Refills: 0 | Status: DISCONTINUED | OUTPATIENT
Start: 2018-05-10 | End: 2018-05-25

## 2018-05-10 RX ORDER — GLUCOSAMINE/CHONDROITIN/C/MANG 500-400 MG
1 CAPSULE ORAL
Qty: 0 | Refills: 0 | COMMUNITY

## 2018-05-10 NOTE — H&P PST ADULT - MUSCULOSKELETAL COMMENTS
stiffness, swelling, pain and decreased ROM left knee, right hip pain, right buttock area pain, right knee pain

## 2018-05-10 NOTE — H&P PST ADULT - PMH
Anxiety    Decreased ROM of left knee    Dyslipidemia    H/O vein stripping  bilateral right 2006 and left 2016  HTN (Hypertension)    Insomnia    Lumbar stenosis    OA (osteoarthritis) of knee    Obesity, Class II, BMI 35-39.9    Scoliosis

## 2018-05-10 NOTE — H&P PST ADULT - FAMILY HISTORY
Mother  Still living? No  Hypertension, Age at diagnosis: Age Unknown  Family history of stroke, Age at diagnosis: Age Unknown     Father  Still living? No  Family history of myocardial infarction at age less than 60, Age at diagnosis: Age Unknown     Sibling  Still living? No  Family history of cancer in sister, Age at diagnosis: Age Unknown

## 2018-05-10 NOTE — H&P PST ADULT - ASSESSMENT
67yo female patient scheduled for surgery on 5/15/18. She will obtain Medical Clearance from her PMD. She will be NPO as per Anesthesia and will take Pepcid @ hs on 5/14. On AM of surgery she will take Pepcid and Diane with a sip of water. She will hold supplements and Aspirin starting today. All other pre-op instructions reviewed with patient.

## 2018-05-10 NOTE — H&P PST ADULT - HISTORY OF PRESENT ILLNESS
69yo female patient who is s/p left TKR in February 2018, and she states that since surgery, she has had swelling, decreased ROM, unable to straighten left leg. She states that the healing has been slow and on follow up with Dr. Castellon she was told that exam and manipulation under anesthesia, and arthroscopic surgery is recommended. She rates the pain at 2-3/10, but does not take anything for pain. She is taking CBD Oil, Tumeric and occasionally Tylenol prn for pain with some relief. She presents today for PSTs.

## 2018-05-10 NOTE — H&P PST ADULT - MUSCULOSKELETAL
details… detailed exam no calf tenderness/no joint erythema/left knee/decreased ROM due to pain/diminished strength/joint swelling/joint warmth/decreased ROM

## 2018-05-10 NOTE — H&P PST ADULT - PSH
History of  Section    History of Tonsillectomy    History of Varicose Veins  with surgery  Status post total left knee replacement  18

## 2018-05-10 NOTE — H&P PST ADULT - PROBLEM SELECTOR PLAN 1
Exam Under Anesthesia, Manipulation Under Anesthesia and Operative Arthroscopy of LEFT Knee on 5/15/18.

## 2018-05-11 ENCOUNTER — APPOINTMENT (OUTPATIENT)
Dept: INTERNAL MEDICINE | Facility: CLINIC | Age: 68
End: 2018-05-11
Payer: MEDICARE

## 2018-05-11 VITALS
WEIGHT: 202 LBS | TEMPERATURE: 98.1 F | HEIGHT: 62 IN | BODY MASS INDEX: 37.17 KG/M2 | OXYGEN SATURATION: 96 % | DIASTOLIC BLOOD PRESSURE: 80 MMHG | SYSTOLIC BLOOD PRESSURE: 140 MMHG | HEART RATE: 82 BPM

## 2018-05-11 PROCEDURE — 99214 OFFICE O/P EST MOD 30 MIN: CPT

## 2018-05-11 RX ORDER — HYDROCHLOROTHIAZIDE 12.5 MG/1
12.5 TABLET ORAL DAILY
Qty: 30 | Refills: 5 | Status: DISCONTINUED | COMMUNITY
Start: 2018-01-05 | End: 2018-05-11

## 2018-05-11 RX ORDER — ASCORBIC ACID 1000 MG
10 TABLET ORAL
Refills: 0 | Status: DISCONTINUED | COMMUNITY
Start: 2018-01-05 | End: 2018-05-11

## 2018-05-14 ENCOUNTER — TRANSCRIPTION ENCOUNTER (OUTPATIENT)
Age: 68
End: 2018-05-14

## 2018-05-14 NOTE — ASU DISCHARGE PLAN (ADULT/PEDIATRIC). - DRESSING FT
clean with peroxide, shower and apply bandaids After 48 hours, remove dressing, cleanse wounds with peroxide and place band-aids over sutures

## 2018-05-14 NOTE — ASU DISCHARGE PLAN (ADULT/PEDIATRIC). - SPECIAL INSTRUCTIONS
apply ice to operative site 20 minutes on and 20 minutes off for next 48 hours  Pain meds as per MD  Take an over the counter stool softener like Colace to avoid constipation while taking a narcotic for pain  Please notify your primary medical provider that a screening test performed here indicated a greater risk for sleep apnea. Further evaluation may be required. Apply ice to operative site 20 minutes on and 20 minutes off for next 48 hours  Pain meds as per MD  Take an over the counter stool softener like Colace to avoid constipation while taking a narcotic for pain  Please notify your primary medical provider that a screening test performed here indicated a greater risk for sleep apnea. Further evaluation may be required.    PLEASE wear left knee brace when sleeping to encourage knee extension and straight leg!!    SEE PT (physical therapy) referral; start sessions AFTER bandage removed on Thursday.

## 2018-05-14 NOTE — ASU DISCHARGE PLAN (ADULT/PEDIATRIC). - MEDICATION SUMMARY - MEDICATIONS TO TAKE
I will START or STAY ON the medications listed below when I get home from the hospital:    tumeric  -- 500 milligram(s) by mouth once a day  -- Indication: For supplement    hyaluronic acid  -- 100 milligram(s) by mouth once a day  -- Indication: For supplement    CBD Oil  -- 3 drop(s) by mouth once a day (at bedtime), As Needed  -- Indication: For As per PMD    Percocet 5/325 oral tablet  -- 1 tab(s) by mouth every 4 hours, As Needed -for moderate pain MDD:6  -- Caution federal law prohibits the transfer of this drug to any person other  than the person for whom it was prescribed.  May cause drowsiness.  Alcohol may intensify this effect.  Use care when operating dangerous machinery.  This prescription cannot be refilled.  This product contains acetaminophen.  Do not use  with any other product containing acetaminophen to prevent possible liver damage.  Using more of this medication than prescribed may cause serious breathing problems.    -- Indication: For moderate to severe knee pain    aspirin 81 mg oral delayed release tablet  -- 2 tab(s) by mouth every 12 hours  -- Indication: For heart health    Diane 10 mg-40 mg oral tablet  -- 1 tab(s) by mouth once a day  -- Indication: For high blood pressure    magnesium carbonate  -- 150 milligram(s) by mouth once a day  -- Indication: For supplement    glucosamine-chondroitin 500 mg-400 mg oral tablet  -- 1 tab(s) by mouth once a day  -- Indication: For supplement    Melatonin 5 mg oral tablet  -- 1 tab(s) by mouth once a day (at bedtime)  -- Indication: For insomnia    Vitamin B12 Methylcobalamin 5000 mcg sublingual tablet  -- 1 tab(s) under tongue once a day  -- Indication: For supplement    Vitamin D3 1000 intl units oral tablet  -- 1 tab(s) by mouth once a day  -- Indication: For supplement

## 2018-05-14 NOTE — ASU DISCHARGE PLAN (ADULT/PEDIATRIC). - ACTIVITY LEVEL
elevate extremity/no heavy lifting/weight bearing as tolerated/no tub baths/no sports/gym weight bearing as tolerated/exercise/no tub baths/as per brochure and PT referral/elevate extremity

## 2018-05-14 NOTE — ASU DISCHARGE PLAN (ADULT/PEDIATRIC). - FOLLOWUP APPOINTMENT CLINIC/PHYSICIAN
Call Dr. Covarrubias' office [phone] to make an appointment to be seen within 7-10 days Call Dr. Castellon's office [phone] to make an appointment to be seen within 7-10 days

## 2018-05-14 NOTE — ASU DISCHARGE PLAN (ADULT/PEDIATRIC). - NOTIFY
Persistent Nausea and Vomiting/Numbness, color, or temperature change to extremity/Bleeding that does not stop/Swelling that continues

## 2018-05-15 ENCOUNTER — OUTPATIENT (OUTPATIENT)
Dept: OUTPATIENT SERVICES | Facility: HOSPITAL | Age: 68
LOS: 1 days | End: 2018-05-15
Payer: MEDICARE

## 2018-05-15 ENCOUNTER — RESULT REVIEW (OUTPATIENT)
Age: 68
End: 2018-05-15

## 2018-05-15 VITALS
HEART RATE: 80 BPM | DIASTOLIC BLOOD PRESSURE: 58 MMHG | SYSTOLIC BLOOD PRESSURE: 159 MMHG | RESPIRATION RATE: 20 BRPM | OXYGEN SATURATION: 98 %

## 2018-05-15 VITALS
HEIGHT: 62 IN | OXYGEN SATURATION: 95 % | HEART RATE: 75 BPM | TEMPERATURE: 98 F | SYSTOLIC BLOOD PRESSURE: 159 MMHG | RESPIRATION RATE: 16 BRPM | WEIGHT: 199.96 LBS | DIASTOLIC BLOOD PRESSURE: 70 MMHG

## 2018-05-15 DIAGNOSIS — M17.12 UNILATERAL PRIMARY OSTEOARTHRITIS, LEFT KNEE: ICD-10-CM

## 2018-05-15 DIAGNOSIS — M24.662 ANKYLOSIS, LEFT KNEE: ICD-10-CM

## 2018-05-15 DIAGNOSIS — Z96.652 PRESENCE OF LEFT ARTIFICIAL KNEE JOINT: Chronic | ICD-10-CM

## 2018-05-15 LAB
HCT VFR BLD CALC: 44.5 % — SIGNIFICANT CHANGE UP (ref 34.5–45)
HGB BLD-MCNC: 14.9 G/DL — SIGNIFICANT CHANGE UP (ref 11.5–15.5)
MCHC RBC-ENTMCNC: 27.6 PG — SIGNIFICANT CHANGE UP (ref 27–34)
MCHC RBC-ENTMCNC: 33.5 GM/DL — SIGNIFICANT CHANGE UP (ref 32–36)
MCV RBC AUTO: 82.4 FL — SIGNIFICANT CHANGE UP (ref 80–100)
PLATELET # BLD AUTO: 215 K/UL — SIGNIFICANT CHANGE UP (ref 150–400)
RBC # BLD: 5.4 M/UL — HIGH (ref 3.8–5.2)
RBC # FLD: 12.7 % — SIGNIFICANT CHANGE UP (ref 10.3–14.5)
WBC # BLD: 9.1 K/UL — SIGNIFICANT CHANGE UP (ref 3.8–10.5)
WBC # FLD AUTO: 9.1 K/UL — SIGNIFICANT CHANGE UP (ref 3.8–10.5)

## 2018-05-15 PROCEDURE — 88305 TISSUE EXAM BY PATHOLOGIST: CPT | Mod: 26

## 2018-05-15 PROCEDURE — 88311 DECALCIFY TISSUE: CPT

## 2018-05-15 PROCEDURE — 88311 DECALCIFY TISSUE: CPT | Mod: 26

## 2018-05-15 PROCEDURE — G8979: CPT | Mod: CI

## 2018-05-15 PROCEDURE — 29873 ARTHRS KNEE SURG W/LAT RLS: CPT | Mod: LT

## 2018-05-15 PROCEDURE — 36415 COLL VENOUS BLD VENIPUNCTURE: CPT

## 2018-05-15 PROCEDURE — 88305 TISSUE EXAM BY PATHOLOGIST: CPT

## 2018-05-15 PROCEDURE — 85027 COMPLETE CBC AUTOMATED: CPT

## 2018-05-15 PROCEDURE — G8980: CPT | Mod: CI

## 2018-05-15 PROCEDURE — 97161 PT EVAL LOW COMPLEX 20 MIN: CPT | Mod: CI

## 2018-05-15 PROCEDURE — G8978: CPT | Mod: CI

## 2018-05-15 RX ORDER — ONDANSETRON 8 MG/1
4 TABLET, FILM COATED ORAL ONCE
Qty: 0 | Refills: 0 | Status: DISCONTINUED | OUTPATIENT
Start: 2018-05-15 | End: 2018-05-15

## 2018-05-15 RX ORDER — FAMOTIDINE 10 MG/ML
0 INJECTION INTRAVENOUS
Qty: 0 | Refills: 0 | COMMUNITY

## 2018-05-15 RX ORDER — SODIUM CHLORIDE 9 MG/ML
1000 INJECTION, SOLUTION INTRAVENOUS
Qty: 0 | Refills: 0 | Status: DISCONTINUED | OUTPATIENT
Start: 2018-05-15 | End: 2018-05-15

## 2018-05-15 RX ORDER — HYDROMORPHONE HYDROCHLORIDE 2 MG/ML
0.5 INJECTION INTRAMUSCULAR; INTRAVENOUS; SUBCUTANEOUS
Qty: 0 | Refills: 0 | Status: DISCONTINUED | OUTPATIENT
Start: 2018-05-15 | End: 2018-05-15

## 2018-05-15 RX ORDER — OXYCODONE AND ACETAMINOPHEN 5; 325 MG/1; MG/1
1 TABLET ORAL
Qty: 30 | Refills: 0
Start: 2018-05-15

## 2018-05-15 RX ORDER — OXYCODONE HYDROCHLORIDE 5 MG/1
5 TABLET ORAL ONCE
Qty: 0 | Refills: 0 | Status: DISCONTINUED | OUTPATIENT
Start: 2018-05-15 | End: 2018-05-15

## 2018-05-15 RX ADMIN — HYDROMORPHONE HYDROCHLORIDE 0.5 MILLIGRAM(S): 2 INJECTION INTRAMUSCULAR; INTRAVENOUS; SUBCUTANEOUS at 11:30

## 2018-05-15 RX ADMIN — HYDROMORPHONE HYDROCHLORIDE 0.5 MILLIGRAM(S): 2 INJECTION INTRAMUSCULAR; INTRAVENOUS; SUBCUTANEOUS at 11:43

## 2018-05-15 RX ADMIN — HYDROMORPHONE HYDROCHLORIDE 0.5 MILLIGRAM(S): 2 INJECTION INTRAMUSCULAR; INTRAVENOUS; SUBCUTANEOUS at 11:53

## 2018-05-15 RX ADMIN — HYDROMORPHONE HYDROCHLORIDE 0.5 MILLIGRAM(S): 2 INJECTION INTRAMUSCULAR; INTRAVENOUS; SUBCUTANEOUS at 12:09

## 2018-05-15 RX ADMIN — OXYCODONE HYDROCHLORIDE 5 MILLIGRAM(S): 5 TABLET ORAL at 12:22

## 2018-05-15 RX ADMIN — SODIUM CHLORIDE 100 MILLILITER(S): 9 INJECTION, SOLUTION INTRAVENOUS at 11:50

## 2018-05-15 RX ADMIN — OXYCODONE HYDROCHLORIDE 5 MILLIGRAM(S): 5 TABLET ORAL at 11:55

## 2018-05-15 NOTE — ASU PREOP CHECKLIST - TEMPERATURE IN FAHRENHEIT (DEGREES F)
Called and left message for pt. To call back. I showed Dr. Keller note and briefly went over this with Dr. Yi Taylor, and what she would recommend regarding the medication refill request. Dr. Yi Taylor advised for pt. To try the Tramadol and the Gabapentin that was given from Dr. Keller, and to follow up with their office regarding those medications.     These are the notes from Dr. Keller from his office visit today 2/22/17.     PLAN AND RECOMMENDATIONS:  I spent the patient that use of oxycodone for musculoskeletal pain like this is not appropriate anymore based on the guidelines that we have. Therefore we will not restart this medicine especially the fact that he has been off it for last 4 days.     I will schedule him for intra-articular versus subacromial bursa injection of the right shoulder for better pain control.     I ordered an MRI of the cervical spine to delineate the anatomy and hopefully be more specific about the pain generator.     Gabapentin 300 mg was prescribed for nighttime use which can be increased to 600 mg. Tramadol prescription is also given for as needed basis for the pain. Patient agrees with this one.     I appreciate the opportunity to participate in the care of this patient and I thank Dr. Taylor for her kind referral. Patient is advised against bed rest and also advised to resume or maintain him usual activities.      25 minutes were spent with patient and greater than 50% of that time was spent in counseling and teaching about the above-mentioned topics.     Julian Keller MD   98.3

## 2018-05-15 NOTE — PHYSICAL THERAPY INITIAL EVALUATION ADULT - RANGE OF MOTION EXAMINATION, REHAB EVAL
left knee NT due to knee immobilizer/Right LE ROM was WFL (within functional limits)/deficits as listed below

## 2018-05-15 NOTE — BRIEF OPERATIVE NOTE - PROCEDURE
<<-----Click on this checkbox to enter Procedure Arthroscopy of left knee  05/15/2018  arthrofibrolysis  Active  [id number]

## 2018-07-09 ENCOUNTER — APPOINTMENT (OUTPATIENT)
Dept: ORTHOPEDIC SURGERY | Facility: CLINIC | Age: 68
End: 2018-07-09
Payer: MEDICARE

## 2018-07-09 VITALS — BODY MASS INDEX: 37.17 KG/M2 | HEIGHT: 62 IN | WEIGHT: 202 LBS

## 2018-07-09 PROCEDURE — 99024 POSTOP FOLLOW-UP VISIT: CPT

## 2018-07-09 PROCEDURE — 73562 X-RAY EXAM OF KNEE 3: CPT | Mod: LT

## 2018-07-16 PROBLEM — E66.01 MORBID (SEVERE) OBESITY DUE TO EXCESS CALORIES: Chronic | Status: INACTIVE | Noted: 2017-12-28 | Resolved: 2018-05-10

## 2018-07-16 PROBLEM — Z87.39 PERSONAL HISTORY OF OTHER DISEASES OF THE MUSCULOSKELETAL SYSTEM AND CONNECTIVE TISSUE: Chronic | Status: INACTIVE | Noted: 2017-12-28 | Resolved: 2018-05-10

## 2018-08-20 ENCOUNTER — APPOINTMENT (OUTPATIENT)
Dept: ORTHOPEDIC SURGERY | Facility: CLINIC | Age: 68
End: 2018-08-20
Payer: MEDICARE

## 2018-08-20 VITALS
HEIGHT: 62 IN | WEIGHT: 2 LBS | DIASTOLIC BLOOD PRESSURE: 88 MMHG | HEART RATE: 71 BPM | BODY MASS INDEX: 0.37 KG/M2 | SYSTOLIC BLOOD PRESSURE: 118 MMHG

## 2018-08-20 PROBLEM — Z98.890 OTHER SPECIFIED POSTPROCEDURAL STATES: Chronic | Status: ACTIVE | Noted: 2017-12-28

## 2018-08-20 PROBLEM — E66.9 OBESITY, UNSPECIFIED: Chronic | Status: ACTIVE | Noted: 2018-05-10

## 2018-08-20 PROBLEM — G47.00 INSOMNIA, UNSPECIFIED: Chronic | Status: ACTIVE | Noted: 2017-12-28

## 2018-08-20 PROBLEM — M48.061 SPINAL STENOSIS, LUMBAR REGION WITHOUT NEUROGENIC CLAUDICATION: Chronic | Status: ACTIVE | Noted: 2017-12-28

## 2018-08-20 PROBLEM — M17.10 UNILATERAL PRIMARY OSTEOARTHRITIS, UNSPECIFIED KNEE: Chronic | Status: ACTIVE | Noted: 2018-01-24

## 2018-08-20 PROBLEM — M25.662 STIFFNESS OF LEFT KNEE, NOT ELSEWHERE CLASSIFIED: Chronic | Status: ACTIVE | Noted: 2018-05-10

## 2018-08-20 PROCEDURE — 99213 OFFICE O/P EST LOW 20 MIN: CPT

## 2018-08-20 PROCEDURE — 73560 X-RAY EXAM OF KNEE 1 OR 2: CPT | Mod: LT

## 2018-10-01 ENCOUNTER — APPOINTMENT (OUTPATIENT)
Dept: ORTHOPEDIC SURGERY | Facility: CLINIC | Age: 68
End: 2018-10-01

## 2018-10-03 ENCOUNTER — APPOINTMENT (OUTPATIENT)
Dept: ORTHOPEDIC SURGERY | Facility: CLINIC | Age: 68
End: 2018-10-03
Payer: MEDICARE

## 2018-10-03 VITALS — BODY MASS INDEX: 36.8 KG/M2 | HEIGHT: 62 IN | WEIGHT: 200 LBS

## 2018-10-03 DIAGNOSIS — M24.662 ANKYLOSIS, LEFT KNEE: ICD-10-CM

## 2018-10-03 PROCEDURE — 73562 X-RAY EXAM OF KNEE 3: CPT | Mod: LT

## 2018-10-03 PROCEDURE — 99214 OFFICE O/P EST MOD 30 MIN: CPT

## 2018-12-02 NOTE — ASU PATIENT PROFILE, ADULT - PMH
Past 24 hrs Anxiety    Decreased ROM of left knee    Dyslipidemia    H/O vein stripping  bilateral right 2006 and left 2016  HTN (Hypertension)    Insomnia    Lumbar stenosis    OA (osteoarthritis) of knee    Obesity, Class II, BMI 35-39.9    Scoliosis

## 2019-01-03 NOTE — H&P PST ADULT - ENERGY EXPENDITURE (METS)
Nutrition services BMI: Day 0 of admit.  Germaine Chaparro is a 73 y.o. 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     Pt with BMI >40 (=40.93). Pt with PMH including HTN, Hypothyroidism, Heart Valve Disease, Diabetes, CHF, Asthma and Arthritis. Pt admitted with dx including Non-Traumatic Spinal Cord Dysfunction and Lumbar Stenosis with Neurogenic Claudication.Current diet order of Diabetic and history of PO intake in acute of % regularly. Weight loss counseling not appropriate in acute care setting.  RD available for consult as needed.     Assessment:  Weight: 115 kg (253 lb 9.6 oz)  Body mass index is 40.93 kg/m².  Diet/Intake: Diabetic 2000kcal     Labs, MAR and Chart reviewed.     Recommendations/Plan:Referral to outpatient nutrition services for weight management after D/C.   1. Diet as ordered.   2. Encourage intake of 50% or greater.   3. Document intake of all meals  as % taken in ADL's to provide interdisciplinary communication across all shifts.   4. Monitor weight.  5. Nutrition rep will continue to see patient for ongoing meal and snack preferences.             
>7

## 2019-05-15 ENCOUNTER — APPOINTMENT (OUTPATIENT)
Dept: ORTHOPEDIC SURGERY | Facility: CLINIC | Age: 69
End: 2019-05-15
Payer: MEDICARE

## 2019-05-15 VITALS — BODY MASS INDEX: 36.8 KG/M2 | HEIGHT: 62 IN | WEIGHT: 200 LBS

## 2019-05-15 DIAGNOSIS — M17.12 UNILATERAL PRIMARY OSTEOARTHRITIS, LEFT KNEE: ICD-10-CM

## 2019-05-15 PROCEDURE — 73562 X-RAY EXAM OF KNEE 3: CPT | Mod: LT

## 2019-05-15 PROCEDURE — 99213 OFFICE O/P EST LOW 20 MIN: CPT

## 2019-05-20 ENCOUNTER — APPOINTMENT (OUTPATIENT)
Dept: ORTHOPEDIC SURGERY | Facility: CLINIC | Age: 69
End: 2019-05-20
Payer: MEDICARE

## 2019-05-20 VITALS
HEART RATE: 75 BPM | HEIGHT: 63 IN | DIASTOLIC BLOOD PRESSURE: 76 MMHG | BODY MASS INDEX: 38.09 KG/M2 | SYSTOLIC BLOOD PRESSURE: 115 MMHG | WEIGHT: 215 LBS

## 2019-05-20 PROCEDURE — 99214 OFFICE O/P EST MOD 30 MIN: CPT

## 2019-05-20 PROCEDURE — 72100 X-RAY EXAM L-S SPINE 2/3 VWS: CPT

## 2019-06-18 NOTE — H&P PST ADULT - VENOUS THROMBOEMBOLISM AGE
6/18/2019         RE: Tricia Sosa  [id number]  [facility] [address]        Dear Colleague,    Thank you for referring your patient, Tricia Sosa, to the [facility]. Please see a copy of my visit note below.    PATIENT HISTORY:   Tricia Sosa is a 78 year old female who presents to clinic for assessment of hammertoes. Would like to discuss surgery. Notes that the right foot 3rd and 4th toes have been a little more painful lately. Good last few days but they get hard at the end and sore to walk on. Pain can be 7/10 at its worst. Wondering what can be done for hammertoes.     Review of Systems:  Patient denies fever, chills, rash, wound, stiffness,numbness, weakness, heart burn, blood in stool, chest pain with activity, calf pain when walking, shortness of breath with activity, chronic cough, easy bleeding/bruising, swelling of ankles, excessive thirst, fatigue, depression, anxiety.  Patient admits to limping at times.     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, and posterior vitreous detachment     Urinary tract infection, site not specified     Recurrent UTI's        PAST SURGICAL HISTORY:   Past Surgical History:   Procedure Laterality Date     C LIGATE FALLOPIAN TUBE       C THYROIDECTOMY       COLONOSCOPY N/A 4/26/2016    Procedure: COLONOSCOPY;  Surgeon: Dontae Del Rio MD;  Location:  GI      COLONOSCOPY W BIOPSY  4/26/00     HC COLONOSCOPY W BIOPSY  10/8/03      COLONOSCOPY W BIOPSY  6/27/05    REPEAT IN 5 YEARS      CYSTOSCOPY,INSERT URETERAL STENT      Ureteral stent insertion      FLEX SIGMOIDOSCOPY W BIOPSY  5/30/00      LAPAROSCOPY, SURGICAL; CHOLECYSTECTOMY  1992     LIGATION OF HEMORRHOID(S)       Hemorrhoidectomy     UPPER GI ENDOSCOPY,BIOPSY  10/01/01        MEDICATIONS:   Current Outpatient Medications:      cyanocobalamin (VITAMIN  B-12) 1000 MCG tablet, , Disp: , Rfl: 3     FLUOCINOLONE ACETONIDE SCALP 0.01 % OIL oil, , Disp: , Rfl:      folic acid (FOLVITE) 1 MG tablet, , Disp: , Rfl: 3     ketoconazole 1 % shampoo, , Disp: , Rfl:      levothyroxine (SYNTHROID/LEVOTHROID) 150 MCG tablet, TAKE 1 TABLET BY MOUTH ONCE DAILY, Disp: 90 tablet, Rfl: 2     sulfamethoxazole-trimethoprim (BACTRIM DS/SEPTRA DS) 800-160 MG per tablet, Take 1 pill orally on Mon, Wed and Friday, Disp: 45 tablet, Rfl: 3     ALLERGIES:    Allergies   Allergen Reactions     Doxycycline         SOCIAL HISTORY:   Social History     Socioeconomic History     Marital status:      Spouse name: Not on file     Number of children: Not on file     Years of education: Not on file     Highest education level: Not on file   Occupational History     Not on file   Social Needs     Financial resource strain: Not on file     Food insecurity:     Worry: Not on file     Inability: Not on file     Transportation needs:     Medical: Not on file     Non-medical: Not on file   Tobacco Use     Smoking status: Never Smoker     Smokeless tobacco: Never Used   Substance and Sexual Activity     Alcohol use: No     Alcohol/week: 0.0 oz     Drug use: No     Sexual activity: Never   Lifestyle     Physical activity:     Days per week: Not on file     Minutes per session: Not on file     Stress: Not on file   Relationships     Social connections:     Talks on phone: Not on file     Gets together: Not on file     Attends Oriental orthodox service: Not on file     Active member of club or organization: Not on file     Attends meetings of clubs or organizations: Not on file     Relationship status: Not on file     Intimate partner violence:     Fear of current or ex partner: Not on file     Emotionally abused: Not on file     Physically abused: Not on file      "Forced sexual activity: Not on file   Other Topics Concern     Parent/sibling w/ CABG, MI or angioplasty before 65F 55M? Not Asked   Social History Narrative     Not on file        FAMILY HISTORY:   Family History   Problem Relation Age of Onset     Cancer Mother          of colon cancer at age 90     Cerebrovascular Disease Father          at age 85     Dementia Brother      Dementia Brother      Dementia Brother         pancreatic cancer     Colon Cancer No family hx of         EXAM:Vitals: Ht 1.854 m (6' 1\")   Wt 67.1 kg (148 lb)   BMI 19.53 kg/m     BMI= Body mass index is 19.53 kg/m .    General appearance: Patient is alert and fully cooperative with history & exam.  No sign of distress is noted during the visit.     Psychiatric: Affect is pleasant & appropriate.  Patient appears motivated to improve health.     Respiratory: Breathing is regular & unlabored while sitting.     HEENT: Hearing is intact to spoken word.  Speech is clear.  No gross evidence of visual impairment that would impact ambulation.     Dermatologic: localized hyperkeratotic lesion to the distal right 3rd and 4th toes. No open lesions or signs of infection.      Vascular: DP & PT pulses are intact & regular bilaterally.  No significant edema or varicosities noted.  CFT and skin temperature is normal to both lower extremities.     Neurologic: Lower extremity sensation is intact to light touch.  No evidence of weakness or contracture in the lower extremities.  No evidence of neuropathy.     Musculoskeletal: Patient is ambulatory without assistive device or brace. Increase arch height. Lateral deviation of the great toes bilateral with prominent 1st metatarsal heads. Semi rigid contractures of toes 2-5 bilateral.     Radiographs:  I personally reviewed the xrays. Increase in 1st and 2nd inertmetatarsal angle. Elongated 2nd metatarsals. Contracture of proximal interphalangeal joint and distal interphalangeal joints toes 2-4.    "   ASSESSMENT:    Bilateral foot pain  Hammertoe, bilateral  Hav (hallux abducto valgus), right  Hav (hallux abducto valgus), left  Callus  Pes cavus     PLAN:  Reviewed patient's chart in [facility]. Reviewed and discussed causes of hammertoes with patient.  Explained that this can be caused by an overpowering of muscles or by the way we walk.  Discussed conservative treatments such as orthotics, pads, shoe gear.  Explained that sometimes the flexor tendons can be cut to try and straighten the toe and reduce rubbing. This is normally done in office and patient is weight bearing in postop she for 1-2 weeks.  We also discussed surgical intervention to remove the joint and possibly fuse the toe.  Normally patient has a pin sticking out of the toe for about 6 weeks and can not get the foot wet. Patient would have to be minimal weight bearing in cam boot.      Would need metatarsal shortening osteotomies 2nd-3rd mets, and proximal interphalangeal joint fusions of toes 2-4.     Reviewed and discussed causes of bunion with patient.  Explained that it is in large part due to a patients foot type and how they walk.   Discussed treatment options with patient including orthotics, splints, pads, and shoe gear.  We discussed that sometimes cortisone injections can help with the pain or physical therapy treatments such as ultrasound to help with pain but does not fix the problem.  Discussed that this is normally a structural issue in the foot and if conservative therapy doesn't work, surgery is considered.  Distal osteotomy versus fusion.  With a distal osteotomy procedure, patient is normally minimally weight bearing in a cam boot for 6 weeks.  With fusion, patient is normally non weight bearing for 6 weeks.  With any surgery, patient needs to take the first 2 weeks off work for icing and elevating and could possible due seated work only for the remaining 4 weeks after that.  We discussed risks of surgery including infection,  neuritis, non union, need for further surgery.    Would need bunion corrected with hammertoe correction. Distal osteotomy would be recommended.     Talked about risks including infection, numbness, continued pain, non union, need for further surgery, blood loss, blood clotting. You will scar. With right foot, could not drive for 6-8 weeks.      she is going to think about surgery. Will call to schedule if she decides.     Mariela Serna DPM, Podiatry/Foot and Ankle Surgery        Again, thank you for allowing me to participate in the care of your patient.        Sincerely,        Mariela Serna DPM, Podiatry/Foot and Ankle Surgery     60-75 yrs

## 2019-10-22 NOTE — OCCUPATIONAL THERAPY INITIAL EVALUATION ADULT - PRECAUTIONS/LIMITATIONS, REHAB EVAL
[facility] Progress Note       Encounter Date:10/22/19  Patient:Augustine Chen  :1940  MRN:[id number]      Chief Complaint: Shortness of breath      Subjective:    Patient recovered from cath nicely yesterday.  Found to have moderate coronary disease mainly involving the right coronary artery but his cardiomyopathy is likely nonischemic in nature.  He was noted to have elevated filling pressures and was given an extra dose of Lasix yesterday evening with a excellent response.        Review of Systems:  Review of Systems   Cardiovascular: Positive for leg swelling. Negative for chest pain and orthopnea.   Respiratory: Positive for shortness of breath.        Medications:    Current Facility-Administered Medications:   •  acetaminophen (TYLENOL) tablet 650 mg, 650 mg, Oral, Q4H PRN **OR** acetaminophen (TYLENOL) 160 MG/5ML solution 650 mg, 650 mg, Oral, Q4H PRN **OR** acetaminophen (TYLENOL) suppository 650 mg, 650 mg, Rectal, Q4H PRN, Juan Francisco Tavarez MD  •  acetaminophen (TYLENOL) tablet 650 mg, 650 mg, Oral, Q4H PRN, Ashley Walker MD  •  aspirin chewable tablet 81 mg, 81 mg, Oral, Daily, Juan Francisco Tavarez MD, 81 mg at 10/22/19 0817  •  atorvastatin (LIPITOR) tablet 10 mg, 10 mg, Oral, Nightly, Juan Francisco Franks MD, 10 mg at 10/21/19 2009  •  bisacodyl (DULCOLAX) EC tablet 5 mg, 5 mg, Oral, Daily PRN, Juan Francisco Tavarez MD  •  bisacodyl (DULCOLAX) suppository 10 mg, 10 mg, Rectal, Daily PRN, Juan Francisco Tavarez MD  •  calcium carbonate (TUMS) chewable tablet 500 mg (200 mg elemental), 2 tablet, Oral, BID PRN, Juan Francisco Tavarez MD  •  carvedilol (COREG) tablet 6.25 mg, 6.25 mg, Oral, BID With Meals, Delmer Reilly MD, 6.25 mg at 10/22/19 08  •  enoxaparin (LOVENOX) syringe 40 mg, 40 mg, Subcutaneous, Q24H, Ashley Walker MD  •  furosemide (LASIX) tablet 40 mg, 40 mg, Oral, BID, Delmer Reilly MD, 40 mg at 10/22/19 0817  •  HYDROcodone-acetaminophen (NORCO) 5-325  MG per tablet 1 tablet, 1 tablet, Oral, Q4H PRN, Ashley Walker MD  •  lisinopril (PRINIVIL,ZESTRIL) tablet 20 mg, 20 mg, Oral, Q24H, Juan Francisco Tavarez MD, 20 mg at 10/22/19 0817  •  morphine injection 1 mg, 1 mg, Intravenous, Q4H PRN **AND** naloxone (NARCAN) injection 0.4 mg, 0.4 mg, Intravenous, Q5 Min PRN, Ashley Walker MD  •  nitroglycerin (NITROSTAT) SL tablet 0.4 mg, 0.4 mg, Sublingual, Q5 Min PRN, Juan Francisco Tavarez MD  •  ondansetron (ZOFRAN) tablet 4 mg, 4 mg, Oral, Q6H PRN **OR** ondansetron (ZOFRAN) injection 4 mg, 4 mg, Intravenous, Q6H PRN, Juan Francisco Tavarez MD  •  ondansetron (ZOFRAN) tablet 4 mg, 4 mg, Oral, Q6H PRN **OR** ondansetron (ZOFRAN) injection 4 mg, 4 mg, Intravenous, Q6H PRN, Ashley Walker MD  •  pantoprazole (PROTONIX) EC tablet 40 mg, 40 mg, Oral, QAM, Juan Francisco Tavarez MD, 40 mg at 10/22/19 0619  •  [COMPLETED] Insert peripheral IV, , , Once **AND** sodium chloride 0.9 % flush 10 mL, 10 mL, Intravenous, PRN, Eloy Dong MD, 10 mL at 10/19/19 2010       Objective:       Vitals:    10/21/19 1929 10/21/19 2323 10/22/19 0500 10/22/19 0659   BP: 116/77 124/85  118/69   BP Location: Left arm Left arm  Left arm   Patient Position: Lying Lying  Sitting   Pulse: 100 82  98   Resp: 18 20  20   Temp: 97.5 °F (36.4 °C) 98.5 °F (36.9 °C)  97.6 °F (36.4 °C)   TempSrc: Oral Oral  Oral   SpO2: 93% 98%  91%   Weight:   115 kg (253 lb 8.5 oz)    Height:               Physical Exam:  Constitutional: Well appearing, well developed, no acute distress   HENT: Oropharynx clear and membrane moist  Eyes: Normal conjunctiva, no sclera icterus.  Neck: Supple, no carotid bruit bilaterally.  Cardiovascular: Tachycardia rate and rhythm, No Murmur, 1+ bilateral lower extremity edema.  Pulmonary: Normal respiratory effort, decreased breath sounds at bases, no wheezing.  Abdominal: Soft, nontender, no hepatosplenomegaly, liver is non-pulsatile.  Neurological: Alert and orient x 3.   Skin: Warm,  dry, no ecchymosis, no rash.  Psych: Appropriate mood and affect. Normal judgment and insight.         Lab Review:   Lab Results   Component Value Date    GLUCOSE 113 (H) 10/21/2019    BUN 13 10/21/2019    CREATININE 0.83 10/21/2019    EGFRIFNONA 89 10/21/2019    BCR 15.7 10/21/2019    K 3.8 10/21/2019    CO2 25.5 10/21/2019    CALCIUM 8.7 10/21/2019    ALBUMIN 4.10 10/18/2019    AST 32 10/18/2019    ALT 31 10/18/2019       Lab Results   Component Value Date    WBC 7.58 10/19/2019    HGB 12.9 10/21/2019    HCT 38 10/21/2019    MCV 92.8 10/19/2019     10/19/2019       Lab Results   Component Value Date    TROPONINT <0.010 10/19/2019       Lab Results   Component Value Date    TSH 4.530 (H) 10/18/2019     Cardiac catheterization images reviewed by myself 10/21/2019:    1.  Left main artery: 0% stenosis.  The vessel trifurcates into left anterior descending, ramus intermedius, and left circumflex arteries.  2.  Left anterior descending artery: Severe proximal calcification with 0% stenosis.  Mid vessel gives rise to a small caliber first diagonal branch with 30% ostial stenosis.  Mid vessel has 30% stenosis.  The remainder the mid to distal vessel has 0% stenosis.  3.  Ramus intermedius: Moderate caliber vessel with 30% proximal stenosis.  4.  Left circumflex artery: Moderate caliber vessel with 30% proximal and mid stenosis.  Distal vessel gives rise to large caliber first obtuse marginal branch with 80% ostial stenosis.  Distal continuation circumflex vessel tapers to a small vessel and has no significant disease.  5.  Right coronary artery: The vessel appears large caliber approximately with 0% stenosis.  There is severe diffuse calcification noted of the mid to distal vessel.  There is stable appearing, diffuse 60 to 70% stenosis of the mid vessel.  Distal vessel has diffuse 10% stenosis.  Distal vessel then bifurcates into a large posterior descending and posterolateral branches both with no significant  disease.  This is a right dominant system.      Echocardiogram 10/19/2019:  · The left ventricular cavity is moderately dilated.  · There is severe global hypokinesis.  · Calculated EF = 23.0%. Estimated EF was in agreement with the calculated EF  · Left ventricular diastolic dysfunction is noted (grade II w/high LAP) consistent with pseudonormalization.  · Right ventricular cavity is mild-to-moderately dilated. Mildly reduced right ventricular systolic function noted.  · Left atrial cavity size is moderate-to-severely dilated.  · Right atrial cavity size is moderate-to-severely dilated.  · The mitral valve leaflets are thickened, calcified, and mildly rheumatic in appearance.  · No significant mitral valve stenosis is present  · Mild-to-moderate mitral valve regurgitation is present.  · Mild to moderate tricuspid valve regurgitation is present.  · Calculated right ventricular systolic pressure from tricuspid regurgitation is 52 mmHg.  · There is no evidence of pericardial effusion.       Assessment:          Diagnosis Plan   1. Acute congestive heart failure, unspecified heart failure type (CMS/HCC)     2. Ventricular tachycardia, non-sustained (CMS/HCC)     3. Lower abdominal pain            Plan:     Mr. Chen is a 79-year-old gentleman with past medical history notable for diabetes, hypertension, mixed hyperlipidemia presented to the hospital with worsening shortness of breath and abdominal discomfort.  He was noted to have nonsustained ventricular tachycardia and a new echocardiogram is demonstrated a newly reduced ejection fraction of 26% with pulmonary hypertension.  Given these new findings we proceeded with right and left heart catheterization to better define his filling pressures and exclude severe significant coronary artery disease.  He underwent cardiac catheterization on 10/21/2019 and was found to have moderate disease within the right coronary artery but not likely explaining his overall  nonischemic cardiomyopathy.  We will continue to medically manage his coronary disease but optimize his congestive heart failure management with further diuresis as he was noted to have elevated filling pressures of 26 mmHg in the left ventricle.  He did respond well to IV Lasix last night and I have transitioned him over to oral Lasix today.  I would like to follow-up on his BMP to ensure his electrolytes are stable and that he does not need supplemental potassium on hospital discharge as well as ensure that he is still making good urine output on oral medications before discharge but can potentially go home later today if making good urine and BMP results are within normal limits.     Acute systolic congestive heart failure:  Nonischemic in etiology although moderate coronary disease but would continue with medical management  Continue carvedilol  Continue lisinopril  Transition IV diuretics to oral diuretics today and monitor response  Follow-up BMP     Hypertension:  Improved with diuresis and transition from metoprolol to carvedilol.      Mixed hyperlipidemia:  Continue statin            Delmer Reilly MD  [facility]  10/22/19  8:18 AM     fall precautions

## 2020-10-26 NOTE — DISCHARGE NOTE ADULT - INSTRUCTIONS
Physical Therapy Daily Treatment Note     Name: Elham Rodas  Clinic Number: [id number]    Therapy Diagnosis:   Encounter Diagnoses   Name Primary?    Bilateral foot pain     Decreased range of motion of both ankles     Impaired gait and mobility     Decreased strength      Physician: Holly Euceda NP    Visit Date: 10/26/2020    Medical Diagnosis from Referral: Posterior tibial tendon dysfunction (PTTD) of right lower extremity  Evaluation Date: 9/15/2020  Authorization Period Expiration: 9/21/20  Plan of Care Expiration: 11/13/20  Visit # / Visits authorized: 5   PTA Visit Number: 0    Time In: 08:30am   Time Out: 09:15 am   Total Billable Time:  45  minutes    Precautions: Standard    Subjective     Pt reports: Feels about the same .  She was not compliant with home exercise program.  Response to previous treatment: Tolerated evaluation well   Functional change: None     Pain: 4 /10  Location: bilateral feet    Objective     Elham received therapeutic exercises to develop strength, endurance, ROM and flexibility for 35 minutes including:    Upright bike: x 6 minutes, Level 2   gastroc stretch on incline, 2 x 1min  Soleus stretch on incline, 2 x 1 min  Seated with lacrosse ball:  -20 reps/LE on ankle DF/PF  -20 reps/LE of rolling length of foot    Seated Towel scrunches, 30 reps/LE  Ankle DF with orange band, 2 x10/LE  Ankle eversion with orange band, 2 x 10 reps/LE    Kinesiotaping  Patient was screened for use of kinesiotape and was cleared for use. Pt. received kinesiotaping on B arches- I strip at % tension       Patient was instructed on duration to wear the tape, proper drying techniques for the tape, and to remove the tape if he/she has any skin irritation: including, but not exclusive to, redness/itchiness/discomfort. Patient verbalized understanding of these instructions.    Not performed today:  Seated heel raises : 2 x 10   Seated arch lifts: 2 x 10      Elham received the following  manual therapy techniques: Joint mobilizations were applied to the: B gastrcfor 10 minutes, including:    STM to gastroc       Home Exercises Provided and Patient Education Provided     Education provided:   - HEP update, exercise technique    Written Home Exercises Provided: yes.  Exercises were reviewed and Elham was able to demonstrate them prior to the end of the session.  Elham demonstrated good  understanding of the education provided.     See EMR under Patient Instructions for exercises provided 10/19/20.    Assessment     Patient tolerated therapy session fairly well. Slight increase in discomfort with soleus stretch on slant board. . Pt reports some discomfort with manual techniques, but she does present with medial gastroc tightness. Will continue to benefit from skilled physical therapy to address deficits. .    Elham is progressing well towards her goals.   Pt prognosis is Good.     Pt will continue to benefit from skilled outpatient physical therapy to address the deficits listed in the problem list box on initial evaluation, provide pt/family education and to maximize pt's level of independence in the home and community environment.     Pt's spiritual, cultural and educational needs considered and pt agreeable to plan of care and goals.     Anticipated barriers to physical therapy: None     Goals:     Short Term Goals: 4 weeks   1. Pt will tolerate HEP for improved strength, functional mobility, ROM, posture, and endurance. (progressing, not met)  2. Pt will report reduced B foot pain to </= 6/10 at worst for improved functional mobility and ability to participate in work activities/ADL's. (progressing, not met)  3. Pt will increase B ankle dorsiflexion AROM to >/= 5 degrees for improved functional mobility and gait mechanics. (progressing, not met)  4. Pt will demo 5/5 strength in BLE's for improved functional mobility, endurance, and posture. (progressing, not met)  5. Pt will report improved  ability to walk and weight-bear first thing in the morning without increase in pain/symptoms for improved functional mobility (progressing, not met)  6. Pt will report compliance with use of ice 2-3x day to manage symptoms (progressing, not met)  7. Pt will report being able to complete standing chores or errands without increase in symptoms for improved functional mobility (progressing, not met)     Long Term Goals: 8 weeks   1. Pt will be I with updated HEP for improved functional mobility, posture, strength, and endurance. (progressing, not met)  2. Pt will report reduced B foot pain to </= 4/10 at worst for improved functional mobility and ability to participate in work activities/ADL's. (progressing, not met)  3. Pt will increase B ankle DF AROM to >/= 10 degrees for improved functional mobility and gait mechanics. (progressing, not met)     4. Pt will report increased ability to complete a work shift without pain/symptoms for improved functional mobility (progressing, not met)    Plan     Cont with PT POC     Nohemi Daniels, PTA        low sodium low sodium  For Constipation :   • Increase your water intake. Drink at least 8 glasses of water daily.  • Try adding fiber to your diet by eating fruits, vegetables and foods that are rich in grains.  • If you do experience constipation, you may take an over-the-counter stool softener/laxative such as Magy Colace, Senekot or  Milk of Magnesia.

## 2021-02-04 NOTE — PATIENT PROFILE ADULT. - FAMILY HISTORY
PRESCRIPTION REFILL POLICY [facility] Statement to Patients April 1, 2014 In an effort to ensure the large volume of patient prescription refills is processed in the most efficient and expeditious manner, we are asking our patients to assist us by calling your Pharmacy for all prescription refills, this will include also your  Mail Order Pharmacy. The pharmacy will contact our office electronically to continue the refill process. Please do not wait until the last minute to call your pharmacy. We need at least 48 hours (2days) to fill prescriptions. We also encourage you to call your pharmacy before going to  your prescription to make sure it is ready. With regard to controlled substance prescription refill requests (narcotic refills) that need to be picked up at our office, we ask your cooperation by providing us with at least 72 hours (3days) notice that you will need a refill. We will not refill narcotic prescription refill requests after 4:00pm on any weekday, Monday through Thursday, or after 2:00pm on Fridays, or on the weekends. We encourage everyone to explore another way of getting your prescription refill request processed using [employer], our patient web portal through our electronic medical record system. [employer] is an efficient and effective way to communicate your medication request directly to the office and  downloadable as an jyoti on your smart phone . [employer] also features a review functionality that allows you to view your medication list as well as leave messages for your physician. Are you ready to get connected? If so please review the attatched instructions or speak to any of our staff to get you set up right away! Thank you so much for your cooperation. Should you have any questions please contact our Practice Administrator. The Physicians and Staff,  [facility] Mother  Still living? No  Hypertension, Age at diagnosis: Age Unknown

## 2021-07-22 ENCOUNTER — APPOINTMENT (OUTPATIENT)
Dept: INTERNAL MEDICINE | Facility: CLINIC | Age: 71
End: 2021-07-22

## 2021-07-23 ENCOUNTER — APPOINTMENT (OUTPATIENT)
Dept: ORTHOPEDIC SURGERY | Facility: CLINIC | Age: 71
End: 2021-07-23
Payer: MEDICARE

## 2021-07-23 VITALS
DIASTOLIC BLOOD PRESSURE: 94 MMHG | BODY MASS INDEX: 38.09 KG/M2 | SYSTOLIC BLOOD PRESSURE: 144 MMHG | WEIGHT: 215 LBS | HEART RATE: 79 BPM | HEIGHT: 63 IN

## 2021-07-23 DIAGNOSIS — M54.16 RADICULOPATHY, LUMBAR REGION: ICD-10-CM

## 2021-07-23 DIAGNOSIS — M40.209 UNSPECIFIED KYPHOSIS, SITE UNSPECIFIED: ICD-10-CM

## 2021-07-23 PROCEDURE — 72100 X-RAY EXAM L-S SPINE 2/3 VWS: CPT

## 2021-07-23 PROCEDURE — 99214 OFFICE O/P EST MOD 30 MIN: CPT

## 2021-07-23 NOTE — PHYSICAL EXAM
[Cane] : ambulates with cane [de-identified] : Examination of the lumbar spine reveals no midline tenderness palpation, step-offs, or skin lesions. Decreased range of motion with respect to flexion, extension, lateral bending, and rotation. No tenderness to palpation of the sciatic notch. No tenderness palpation of the bilateral greater trochanters. No pain with passive internal/external rotation of the hips. No instability of bilateral lower extremities.  Negative BULMARO. Negative straight leg raise bilaterally. No bowstring. Negative femoral stretch. 5 out of 5 iliopsoas, hip abductors, hips adductors, quadriceps, hamstrings, gastrocsoleus, tibialis anterior, extensor hallucis longus, peroneals. Grossly intact sensation to light touch bilateral lower extremities. 1+ patellar and Achilles reflexes. Downgoing Babinski. No clonus. Intact proprioception. Palpable pulses. No skin lesion and no edema on the right and left lower extremities. [de-identified] : AP lateral lumbar x-rays reveals lumbar scoliosis with some thoracolumbar kyphosis.  No aggressive lesions.  No gross change when compared to prior imaging.

## 2022-04-12 NOTE — ASU DISCHARGE PLAN (ADULT/PEDIATRIC). - DRIVING
MEDICAL ELIGIBILITY FORM    Name: Sonam Lacey YOB: 2004     Sonam is Medically eligible for all sports without restriction    Recommendations: N/A    I have examined the student named on this form and completed the preparticipation physical evaluation. The athlete does not have apparent clinical contraindications to practice and can participate in the sport(s) as outlined on this form. A copy of the physical examination findings are on record in my office and can be made available to the school at the request of the parents. If conditions arise after the athlete has been cleared for participation, the physician may rescind the medical eligibility until the problem is resolved and the potential consequences are completely explained to the athlete (and parents or guardians).    Name of health care professional (print or type): Caroline Hernadez MD Date: 4/12/2022     Address: [address] [address]  Dept: [phone]  Loc: [phone]     Signature of health care professional: _______________________________________      SHARED EMERGENCY INFORMATION    No Known Allergies    Current Outpatient Medications   Medication Instructions   • Multiple Vitamin (MULTIVITAMIN PO) Oral       Other information:_____________________________________________________________________  _____________________________________________________________________________________  _____________________________________________________________________________________    Emergency contacts:___________________________________________________________________  _____________________________________________________________________________________  _____________________________________________________________________________________     © 2019 [place] [employer], American Academy of Pediatrics, American College of Sport Medicine,  American Medical Society for Sports Medicine, American Orthopaedics Society for Sport Medicine, and American Osteopathic Academy of Sports Medicine.  Permission is granted to reprint for noncommercial, educational purposes with acknowledgement.      PHYSICAL EXAMINATION FORM     Name: Sonam Lacey YOB: 2004   PHYSICIAN REMINDERS  1. Consider additional questions on more-sensitive issues.  · Do you feel stressed out or under a lot of pressure?  · Do you feel sad, hopeless, depressed or anxious?  · Do you feel safe at your home or residence?  · During the past 30 days, did you use chewing tobacco, snuff or dip?  · Do you drink alcohol or user any other drugs?  · Have you even taken anabolic steroids or used any other performance-enhancing supplement?  · Have you even take any supplements to help you gain or lose weight or improve your performance?  · Do you wear a seat belt, use a helmet, and use condoms?  2.  Consider reviewing questions on cardiovascular symptoms (Q4-Q13 of History Form).    EXAMINATION     Vitals: /74   Pulse 83   Temp 99.1 °F (37.3 °C) (Temporal)   Ht 5' 3\" (1.6 m)   Wt 56.2 kg (124 lb)   BMI 21.97 kg/m²   BSA 1.58 m²    Vision: R 20/               L 20/                      Corrected  Y         N     MEDICAL NORMAL ABNORMAL FINDINGS   Appearance  · Marfan stigmata (kyphoscoliosis, high-arched palate, pectus excavatum, arachnodactyly, arm span > height, hyperlaxity, myopia, MVP, aortic insufficiency) Yes    Eyes, ears, nose, throat  · Pupils equal  · Hearing Yes    Lymph nodes Yes    Heart*  · Murmurs (auscultation standing, auscultation supine, +/- Valsalva maneuver) Yes    Lungs Yes    Abdomen Yes    Skin  · Herpes simplex virus (HSV), lesions suggestive of methicillin-resistant Staphylococcus aureus MRSA, or tinea corporis Yes    MUSCULOSKELETAL NORMAL ABNORMAL FINDINGS   Neck Yes    Back Yes    Shoulder and arm Yes    Elbow and forearm Yes    Wrist, hand, and  No fingers Yes    Hip and thigh Yes    Knee Yes    Leg and ankle Yes    Foot and toes Yes    Functional  · Double-leg squat test, single-leg squat test, and box drop or step drop test Yes    * Consider electrocardiography ECG, echocardiogram, referral to cardiology for abnormal cardiac history or examination finding, or a combination of those.  Name of health care professional (print or type): Caroline Hernadez MD  Date: 4/12/2022  Address: [address] [address]  Dept: [phone]  Loc: [phone]   Signature of health care professional: _______________________________________    © 2019 American Academy of Family Physicians, American Academy of Pediatrics, American College of Sport Medicine, American Medical Society for Sports Medicine, American Orthopaedics Society for Sport Medicine, and American Osteopathic Academy of Sports Medicine.  Permission is granted to reprint for noncommercial, educational purposes with acknowledgement.         Name: Sonam Lacey YOB: 2004   Supplemental COVID-19 questions     1.  Have you had any of the following symptoms in the past 14 days?           a) Fever or chills Yes  /  No          b) Cough Yes  /  No          c) Shortness of breath or difficulty breathing Yes  /  No          d) Fatigue Yes  /  No          e) Muscle or body aches Yes  /  No          f) Headache Yes  /  No          g) New loss of taste or smell Yes  /  No          h) Sore throat Yes  /  No          i) Congestion or runny nose Yes  /  No          j) Nausea or vomiting Yes  /  No          k) Diarrhea Yes  /  No          l) Date symptoms started _______          m) Date symptoms resolved _______   2.  Have you ever had a positive test for COVID-19? Yes  /  No          If yes _______                 i.  Date of test Yes  /  No                 ii. Were you tested because you had symptoms? Yes  /  No                 If  yes:                         a) Date symptoms started _______                        b) Date symptoms resolved _______                        c) Were you hospitalized? Yes  /  No                        d) Did you have fever > 100.4 F.? Yes  /  No                               If yes, how many days did your fever last? _______                        e) Did you have muscle aches, chills, or lethargy? Yes  /  No                               If yes, how many days did these symptoms last? _______                        f) Have you had the vaccine? Yes  /  No                 iii. Were you tested because you were exposed to someone with COVID-19, but you did not have                     any symptoms? Yes  /  No   3. Has anyone living in your household had any of the following symptoms or tested positive for COVID-19 in the past 14 days? Yes  /  No          If Yes, [place] the applicable symptoms.     • Fever or chills • Shortness of breath or difficulty breathing    • Muscle or body aches • New loss of taste or smell    • Nausea or vomiting • Congestion or runny nose    • Sore throat           • Headache           • Cough • Fatigue           • Diarrhea       4. Have you been within 6 feet for more than 15 minutes of someone with COVID-19 in the past 14 days? Yes  /  No          If yes: date(s) of exposure _______   5. Are you currently waiting on results from a recent COVID test? Yes  /  No     Sources:  • Interim Guidance on the Preparticipation Physical Examination... : Clinical Journal of Sport Medicine (lww.com)  • Supplemental COVID19 Questions (lww.com)  • COVID19 Interim Guidance: Return to Sports and Physical Activity (aap.org)      HISTORY FORM  Note: Complete and sign this form (with your parents if younger than 18) before your appointment.  Name: Sonam Lacey YOB: 2004     Date of examination: 4/12/2022  Sport(s):_________________________________________   Sex assigned at birth: (F, M, or other):  female How do you identify your gender?(F, M, or other):_________     List past and current medical conditions:____________________________________________________________________  _______________________________________________________________________________________________________________    Have you ever had surgery? If yes, list all past surgical procedures: ______________________________________________  _______________________________________________________________________________________________________________    Medicines and supplements: List all current prescriptions, over-the-counter medicines, and supplements (herbal and nutritional):   ______________________________________________________________________________________________________________  ______________________________________________________________________________________________________________    Do you have any allergies? If yes, please list all your allergies (ie, medicines, pollens, food, stinging insects).   ______________________________________________________________________________________________________________  ______________________________________________________________________________________________________________       Patient Health Questionnaire Version 4 (PHQ-4)  Over the last 2 weeks, how often have you been bothered by any of the following problems? ([place] response.)   Not at all Several days Over half the days Nearly every day   Feeling nervous, anxious, or on edge 0 1 2 3   Not being able to stop or control worrying 0 1 2 3   Little interest or pleasure in doing things 0 1 2 3   Feeling down, depressed, or hopeless 0 1 2 3   (A sum of ?3 is considered positive on either subscale [questions 1 and 2, or questions 3 and 4] for screening purposes.)     GENERAL QUESTIONS  (Explain “Yes” answers at the end of this form.  [place] questions if you don’t know the answer.)     Yes     No   1. Do you have any concerns that  you would like to discuss with your provider?       2. Has a provider ever denied or restricted your participation in sports for any reason?       3. Do you have any ongoing medical issues or recent illness?       HEART HEALTH QUESTIONS ABOUT YOU Yes No   4. Have you ever passed out or nearly passed out during or after exercise?       5. Have you ever had discomfort, pain, tightness, or pressure in your chest during exercise?       6. Does your heart ever race, flutter in your chest, or skip beats (irregular beats) during exercise?       7. Has a doctor ever told you that you have any heart problems?       8. Has a doctor ever requested a test for your heart? For example, electrocardiography (ECG) or echocardiography.      HEART HEALTH QUESTIONS ABOUT YOU (CONTINUED ) Yes No   9. Do you get light-headed or feel shorter of breath than your friends during exercise?       10. Have you ever had a seizure?       HEART HEALTH QUESTIONS ABOUT YOUR FAMILY Yes No   11. Has any family member or relative  of heart problems or had an unexpected or unexplained sudden death before age 35 years (including drowning or unexplained car crash)?       12. Does anyone in your family have a genetic heart problem such as hypertrophic cardiomyopathy (HCM), Marfan syndrome, arrhythmogenic right ventricular cardiomyopathy (ARVC), long QT syndrome (LQTS), short QT syndrome (SQTS), Brugada syndrome, or catecholaminergic polymorphic ventricular tachycardia (CPVT)?       13. Has anyone in your family had a pacemaker or an implanted defibrillator before age 35?                Name: Sonam Lacey YOB: 2004     BONE AND JOINT QUESTIONS Yes No   14. Have you ever had a stress fracture or an injury to a bone, muscle, ligament, joint, or tendon that caused you to miss a practice or game?       15. Do you have a bone, muscle, ligament, or joint injury that bothers you?       MEDICAL QUESTIONS Yes No   16. Do you cough, wheeze, or  have difficulty breathing during or after exercise?       17. Are you missing a kidney, an eye, a testicle (males), your spleen, or any other organ?       18. Do you have groin or testicle pain or a painful bulge or hernia in the groin area?       19. Do you have any recurring skin rashes or rashes that come and go, including herpes or methicillin-resistant Staphylococcus aureus  (MRSA)?       20. Have you had a concussion or head injury that caused confusion, a prolonged headache, or memory problems?       21. Have you ever had numbness, had tingling, had weakness in your arms or legs, or been unable to move your arms or legs after being hit or falling?       22. Have you ever become ill while exercising in the heat?       23. Do you or does someone in your family have sickle cell trait or disease?       24. Have you ever had or do you have any problems with your eyes or vision?        MEDICAL QUESTIONS (CONTINUED ) Yes No   25. Do you worry about your weight?         26. Are you trying to or has anyone recommended that you gain or lose weight?       27. Are you on a special diet or do you avoid certain types of foods or food groups?       28. Have you ever had an eating disorder?       FEMALES ONLY     29. Have you ever had a menstrual period?       30. How old were you when you had your first menstrual period?       31. When was your most recent menstrual period?       32. How many periods have you had in the past 12 months?         Explain \"Yes\" answers here.  ______________________________________________    ______________________________________________    ______________________________________________    ______________________________________________    ______________________________________________    ______________________________________________    ______________________________________________    ______________________________________________     I hereby state that, to the best of my knowledge, my  answers to the questions on this form are complete and correct.    Signature of athlete: ____________________________________________________________________________________    Signature of parent or guardian: ___________________________________________________________________________  Date: ________________________________________________  _____________________________________________________________________________________________________  © 2019 American Academy of Family Physicians, American Academy of Pediatrics, American College of Sports Medicine, American Medical Society for Sports Medicine, American Orthopaedic Society for Sports Medicine, and American Osteopathic Academy of Sports Medicine. Permission is granted to reprint for noncommercial, educational purposes with acknowledgment.

## 2022-05-16 ENCOUNTER — APPOINTMENT (OUTPATIENT)
Dept: ORTHOPEDIC SURGERY | Facility: CLINIC | Age: 72
End: 2022-05-16
Payer: MEDICARE

## 2022-05-16 VITALS — HEIGHT: 63 IN | WEIGHT: 215 LBS | BODY MASS INDEX: 38.09 KG/M2

## 2022-05-16 PROCEDURE — 99215 OFFICE O/P EST HI 40 MIN: CPT

## 2022-05-29 ENCOUNTER — APPOINTMENT (OUTPATIENT)
Dept: MRI IMAGING | Facility: CLINIC | Age: 72
End: 2022-05-29

## 2022-05-31 ENCOUNTER — APPOINTMENT (OUTPATIENT)
Dept: MRI IMAGING | Facility: CLINIC | Age: 72
End: 2022-05-31

## 2022-06-01 ENCOUNTER — APPOINTMENT (OUTPATIENT)
Dept: MRI IMAGING | Facility: CLINIC | Age: 72
End: 2022-06-01
Payer: MEDICARE

## 2022-06-01 ENCOUNTER — OUTPATIENT (OUTPATIENT)
Dept: OUTPATIENT SERVICES | Facility: HOSPITAL | Age: 72
LOS: 1 days | End: 2022-06-01
Payer: MEDICARE

## 2022-06-01 DIAGNOSIS — M51.36 OTHER INTERVERTEBRAL DISC DEGENERATION, LUMBAR REGION: ICD-10-CM

## 2022-06-01 DIAGNOSIS — Z96.652 PRESENCE OF LEFT ARTIFICIAL KNEE JOINT: Chronic | ICD-10-CM

## 2022-06-01 DIAGNOSIS — M48.07 SPINAL STENOSIS, LUMBOSACRAL REGION: ICD-10-CM

## 2022-06-01 PROCEDURE — G1004: CPT

## 2022-06-01 PROCEDURE — 72148 MRI LUMBAR SPINE W/O DYE: CPT | Mod: 26,ME

## 2022-06-01 PROCEDURE — 72148 MRI LUMBAR SPINE W/O DYE: CPT | Mod: ME

## 2022-06-03 ENCOUNTER — APPOINTMENT (OUTPATIENT)
Dept: MRI IMAGING | Facility: CLINIC | Age: 72
End: 2022-06-03

## 2022-06-20 ENCOUNTER — APPOINTMENT (OUTPATIENT)
Dept: ORTHOPEDIC SURGERY | Facility: CLINIC | Age: 72
End: 2022-06-20
Payer: MEDICARE

## 2022-06-20 VITALS — HEIGHT: 63 IN | BODY MASS INDEX: 38.09 KG/M2 | WEIGHT: 215 LBS

## 2022-06-20 DIAGNOSIS — M51.36 OTHER INTERVERTEBRAL DISC DEGENERATION, LUMBAR REGION: ICD-10-CM

## 2022-06-20 PROCEDURE — 99214 OFFICE O/P EST MOD 30 MIN: CPT

## 2022-06-22 ENCOUNTER — APPOINTMENT (OUTPATIENT)
Dept: INTERNAL MEDICINE | Facility: CLINIC | Age: 72
End: 2022-06-22
Payer: MEDICARE

## 2022-06-22 VITALS
TEMPERATURE: 98.4 F | HEIGHT: 63 IN | SYSTOLIC BLOOD PRESSURE: 187 MMHG | HEART RATE: 90 BPM | WEIGHT: 198 LBS | DIASTOLIC BLOOD PRESSURE: 70 MMHG | BODY MASS INDEX: 35.08 KG/M2

## 2022-06-22 DIAGNOSIS — Z00.00 ENCOUNTER FOR GENERAL ADULT MEDICAL EXAMINATION W/OUT ABNORMAL FINDINGS: ICD-10-CM

## 2022-06-22 DIAGNOSIS — R53.83 OTHER FATIGUE: ICD-10-CM

## 2022-06-22 DIAGNOSIS — E27.8 OTHER SPECIFIED DISORDERS OF ADRENAL GLAND: ICD-10-CM

## 2022-06-22 DIAGNOSIS — E66.9 OBESITY, UNSPECIFIED: ICD-10-CM

## 2022-06-22 PROCEDURE — 36415 COLL VENOUS BLD VENIPUNCTURE: CPT

## 2022-06-22 PROCEDURE — G0439: CPT

## 2022-06-22 RX ORDER — MELOXICAM 15 MG/1
15 TABLET ORAL
Qty: 30 | Refills: 1 | Status: DISCONTINUED | COMMUNITY
Start: 2022-05-16 | End: 2022-06-22

## 2022-06-22 NOTE — ASSESSMENT
[FreeTextEntry1] : 71 y/o female for AWV\par \par HTN: follows with Dr. pavon (renal/HTN), labile\par \par Gout: reports occurred over holidays with richer food, has colchicine as rx for Dr Pavon\par -check uric acid, we disussed chlorthalidone as a trigger\par \par Systolic murmur: had echo in 2016 will repeat for monitoring or MR\par \par Obesity: metabolic labs ordered\par \par Jury duty letter written\par \par HCM:\par Declines all vaccines, breast, colon cancer screening\par WIll consider DEXA\par Discussed HCP, prefers DNR will discuss with her daughter.

## 2022-06-22 NOTE — PHYSICAL EXAM
[No Acute Distress] : no acute distress [No Respiratory Distress] : no respiratory distress  [Clear to Auscultation] : lungs were clear to auscultation bilaterally [Normal Rate] : normal rate  [Regular Rhythm] : with a regular rhythm [de-identified] : systolic murmur 3/6

## 2022-06-22 NOTE — HISTORY OF PRESENT ILLNESS
[de-identified] : 73 y/o female for AWV\par \par Last seen here in 2018 for preop exam, here today for a check up and requests a jury duty letter. \par \par Has been seeing Dr. Vitale for her BPs q 6 months\par \par Has been seeing multiple specialists for her ongoing orthopedic issues, largely right side.  Is not excited by the idea of right knee replacement.  \par \par Was recently diagnosed with gout (saw  twice for this).  had it in her left great toe and once in the wrist.  Dr. Vitale prescribed her colchicine for prn use. She reports they discussed stopping her chlorthalidone but have not arrived at a decision yet.\par \par Current medications are\par Amlodipine/olmesartan 10/40 - half tablet\par Chlorthalidone 25mg\par \par Does not check her BPs at home regulalry\par Uses tylenol prn for pain, thought does not have pain when doing nothing.\par \par c/o fatigue with tiredness for at least a year.  does not have chest pain or SOB, but does not do much.  \par Last lab work was two months ago.  \par \par HCM:\par Declines mammogram, colon cancer screening\par WOuld conisder DEXA\par Declines all vaccines including COVID

## 2022-06-22 NOTE — HEALTH RISK ASSESSMENT
[None] : None [With Family] : lives with family [Retired] : retired [Fully functional (bathing, dressing, toileting, transferring, walking, feeding)] : Fully functional (bathing, dressing, toileting, transferring, walking, feeding) [Fully functional (using the telephone, shopping, preparing meals, housekeeping, doing laundry, using] : Fully functional and needs no help or supervision to perform IADLs (using the telephone, shopping, preparing meals, housekeeping, doing laundry, using transportation, managing medications and managing finances) [Reports normal functional visual acuity (ie: able to read med bottle)] : Reports normal functional visual acuity [Reviewed no changes] : Reviewed, no changes [Designated Healthcare Proxy] : Designated healthcare proxy [Name: ___] : Health Care Proxy's Name: [unfilled]  [Relationship: ___] : Relationship: [unfilled] [Former] : Former [20 or more] : 20 or more [Yes] : Yes [2 - 4 times a month (2 pts)] : 2-4 times a month (2 points) [1 or 2 (0 pts)] : 1 or 2 (0 points) [No] : In the past 12 months have you used drugs other than those required for medical reasons? No [No falls in past year] : Patient reported no falls in the past year [YearQuit] : 1988 [Audit-CScore] : 2 [Patient declined mammogram] : Patient declined mammogram [Patient declined colonoscopy] : Patient declined colonoscopy [Change in mental status noted] : No change in mental status noted [Reports changes in hearing] : Reports no changes in hearing [Reports changes in vision] : Reports no changes in vision [de-identified] : jerzy [AdvancecareDate] : 06/22/2022

## 2022-06-23 ENCOUNTER — NON-APPOINTMENT (OUTPATIENT)
Age: 72
End: 2022-06-23

## 2022-06-24 LAB
25(OH)D3 SERPL-MCNC: 40.3 NG/ML
ALBUMIN SERPL ELPH-MCNC: 4.1 G/DL
ALP BLD-CCNC: 54 U/L
ALT SERPL-CCNC: 23 U/L
ANION GAP SERPL CALC-SCNC: 13 MMOL/L
AST SERPL-CCNC: 27 U/L
BASOPHILS # BLD AUTO: 0.09 K/UL
BASOPHILS NFR BLD AUTO: 0.9 %
BILIRUB SERPL-MCNC: 0.4 MG/DL
BUN SERPL-MCNC: 20 MG/DL
CALCIUM SERPL-MCNC: 9.4 MG/DL
CHLORIDE SERPL-SCNC: 98 MMOL/L
CHOLEST SERPL-MCNC: 220 MG/DL
CO2 SERPL-SCNC: 28 MMOL/L
CREAT SERPL-MCNC: 0.75 MG/DL
EGFR: 85 ML/MIN/1.73M2
EOSINOPHIL # BLD AUTO: 0.23 K/UL
EOSINOPHIL NFR BLD AUTO: 2.3 %
ESTIMATED AVERAGE GLUCOSE: 137 MG/DL
GLUCOSE SERPL-MCNC: 105 MG/DL
HBA1C MFR BLD HPLC: 6.4 %
HCT VFR BLD CALC: 47 %
HDLC SERPL-MCNC: 34 MG/DL
HGB BLD-MCNC: 14.9 G/DL
IMM GRANULOCYTES NFR BLD AUTO: 1 %
LDLC SERPL CALC-MCNC: NORMAL MG/DL
LYMPHOCYTES # BLD AUTO: 2.65 K/UL
LYMPHOCYTES NFR BLD AUTO: 26.2 %
MAN DIFF?: NORMAL
MCHC RBC-ENTMCNC: 28.5 PG
MCHC RBC-ENTMCNC: 31.7 GM/DL
MCV RBC AUTO: 90 FL
MONOCYTES # BLD AUTO: 1.23 K/UL
MONOCYTES NFR BLD AUTO: 12.2 %
NEUTROPHILS # BLD AUTO: 5.82 K/UL
NEUTROPHILS NFR BLD AUTO: 57.4 %
NONHDLC SERPL-MCNC: 186 MG/DL
PLATELET # BLD AUTO: 191 K/UL
POTASSIUM SERPL-SCNC: 4 MMOL/L
PROT SERPL-MCNC: 6.9 G/DL
RBC # BLD: 5.22 M/UL
RBC # FLD: 14.2 %
SODIUM SERPL-SCNC: 138 MMOL/L
TRIGL SERPL-MCNC: 487 MG/DL
TSH SERPL-ACNC: 1.5 UIU/ML
URATE SERPL-MCNC: 8.2 MG/DL
WBC # FLD AUTO: 10.12 K/UL

## 2022-07-10 ENCOUNTER — TRANSCRIPTION ENCOUNTER (OUTPATIENT)
Age: 72
End: 2022-07-10

## 2022-10-26 ENCOUNTER — APPOINTMENT (OUTPATIENT)
Dept: INTERNAL MEDICINE | Facility: CLINIC | Age: 72
End: 2022-10-26

## 2022-10-26 VITALS
TEMPERATURE: 98 F | WEIGHT: 194 LBS | SYSTOLIC BLOOD PRESSURE: 161 MMHG | HEART RATE: 70 BPM | HEIGHT: 63 IN | OXYGEN SATURATION: 96 % | DIASTOLIC BLOOD PRESSURE: 77 MMHG | BODY MASS INDEX: 34.38 KG/M2

## 2022-10-26 DIAGNOSIS — M25.559 PAIN IN UNSPECIFIED HIP: ICD-10-CM

## 2022-10-26 DIAGNOSIS — I10 ESSENTIAL (PRIMARY) HYPERTENSION: ICD-10-CM

## 2022-10-26 DIAGNOSIS — M10.9 GOUT, UNSPECIFIED: ICD-10-CM

## 2022-10-26 DIAGNOSIS — R73.03 PREDIABETES.: ICD-10-CM

## 2022-10-26 PROCEDURE — 36415 COLL VENOUS BLD VENIPUNCTURE: CPT

## 2022-10-26 PROCEDURE — 99214 OFFICE O/P EST MOD 30 MIN: CPT | Mod: 25

## 2022-10-26 NOTE — ASSESSMENT
[FreeTextEntry1] : 71 y/o female\par \par Main complaint today is limitation in her ability to ambulate comfortably.  Feels unclear if this is related to her back or  her joints.  As she was last seen by ortho for her joint 1.5 years ago, advised f/u with that team.  While spinal stenosis may be contributing, she will need to return to ortho spine.  SHe expressed her frustration with some of her providers/offices, I advised her to return to the group she is most comfortable with.  I have asked that she address her immobliity and disability assessment with these teams.\par \par HTN: remains very uncontrolled, she wishes to address this with Dr. Floyd\par \par Borderline DM:  repeat labs today, may need treatment in near future\par \par HYperTG:  Repeat Lipids with direct LDL today\par \par Overall CV is elevated.  \par \par Declines flu shot.

## 2022-10-26 NOTE — HISTORY OF PRESENT ILLNESS
[de-identified] : 73 y/o female here for f/u\par \par Last seen in June, TG quite high, was asked to complete additional labs along wioth a repeat A1c but has not been able to\par states she is due to see her HTN physician (Dr. Floyd) in November\par She reports she just requested a refill on her gout medications as this is still an issue for her\par States there has been no changes in her meds otherwise\par \par Hx of left TKR which helped with her left knee symptoms, is upset as she cannot walk despite this.  Has pain in her right knee, right ankle, right posterior thigh, right hip.  ALso reports lower back pain from spinal stenosis.\par \par Had her left knee done [facility].  \par Was evaluated for her right hip and right knee by by Dr. Lozada last in July 2021, reviewed consult note, which advised PT and orthovisc injections, had one injection did not return for f/u\par Saw Dr. Jett for the spine earliier this year, MR in chart, conservative treatment recommended (Pain management, massage, chiropractor) - not clear that she partook in these recommendations, does report accupuncture therapy.  \par \par Requests handicap permit application completion

## 2022-10-28 DIAGNOSIS — E78.2 MIXED HYPERLIPIDEMIA: ICD-10-CM

## 2022-10-28 LAB
ALBUMIN SERPL ELPH-MCNC: 4.2 G/DL
ALP BLD-CCNC: 67 U/L
ALT SERPL-CCNC: 37 U/L
ANION GAP SERPL CALC-SCNC: 15 MMOL/L
AST SERPL-CCNC: 40 U/L
BASOPHILS # BLD AUTO: 0.09 K/UL
BASOPHILS NFR BLD AUTO: 0.8 %
BILIRUB SERPL-MCNC: 0.6 MG/DL
BUN SERPL-MCNC: 16 MG/DL
CALCIUM SERPL-MCNC: 9.7 MG/DL
CHLORIDE SERPL-SCNC: 101 MMOL/L
CHOLEST SERPL-MCNC: 251 MG/DL
CO2 SERPL-SCNC: 26 MMOL/L
CREAT SERPL-MCNC: 0.77 MG/DL
EGFR: 82 ML/MIN/1.73M2
EOSINOPHIL # BLD AUTO: 0.19 K/UL
EOSINOPHIL NFR BLD AUTO: 1.7 %
ESTIMATED AVERAGE GLUCOSE: 137 MG/DL
GLUCOSE SERPL-MCNC: 128 MG/DL
HBA1C MFR BLD HPLC: 6.4 %
HCT VFR BLD CALC: 46.1 %
HDLC SERPL-MCNC: 38 MG/DL
HGB BLD-MCNC: 15.1 G/DL
IMM GRANULOCYTES NFR BLD AUTO: 0.5 %
LDLC SERPL CALC-MCNC: 143 MG/DL
LYMPHOCYTES # BLD AUTO: 2.92 K/UL
LYMPHOCYTES NFR BLD AUTO: 26.4 %
MAN DIFF?: NORMAL
MCHC RBC-ENTMCNC: 29.2 PG
MCHC RBC-ENTMCNC: 32.8 GM/DL
MCV RBC AUTO: 89 FL
MONOCYTES # BLD AUTO: 1.15 K/UL
MONOCYTES NFR BLD AUTO: 10.4 %
NEUTROPHILS # BLD AUTO: 6.65 K/UL
NEUTROPHILS NFR BLD AUTO: 60.2 %
NONHDLC SERPL-MCNC: 214 MG/DL
PLATELET # BLD AUTO: 199 K/UL
POTASSIUM SERPL-SCNC: 3.8 MMOL/L
PROT SERPL-MCNC: 6.9 G/DL
RBC # BLD: 5.18 M/UL
RBC # FLD: 14.3 %
SODIUM SERPL-SCNC: 141 MMOL/L
TRIGL SERPL-MCNC: 352 MG/DL
URATE SERPL-MCNC: 9.5 MG/DL
WBC # FLD AUTO: 11.06 K/UL

## 2022-10-28 RX ORDER — HYDROCORTISONE 1 %
12 CREAM (GRAM) TOPICAL
Qty: 400 | Refills: 0 | Status: DISCONTINUED | COMMUNITY
Start: 2022-10-07

## 2022-10-28 RX ORDER — COLCHICINE 0.6 MG/1
0.6 CAPSULE ORAL
Qty: 9 | Refills: 0 | Status: ACTIVE | COMMUNITY
Start: 2022-10-18

## 2023-04-20 ENCOUNTER — APPOINTMENT (OUTPATIENT)
Dept: RADIOLOGY | Facility: CLINIC | Age: 73
End: 2023-04-20

## 2023-04-20 ENCOUNTER — APPOINTMENT (OUTPATIENT)
Dept: MRI IMAGING | Facility: CLINIC | Age: 73
End: 2023-04-20

## 2023-04-28 ENCOUNTER — OUTPATIENT (OUTPATIENT)
Dept: OUTPATIENT SERVICES | Facility: HOSPITAL | Age: 73
LOS: 1 days | End: 2023-04-28
Payer: MEDICARE

## 2023-04-28 ENCOUNTER — APPOINTMENT (OUTPATIENT)
Dept: CT IMAGING | Facility: CLINIC | Age: 73
End: 2023-04-28
Payer: MEDICARE

## 2023-04-28 DIAGNOSIS — Z96.652 PRESENCE OF LEFT ARTIFICIAL KNEE JOINT: Chronic | ICD-10-CM

## 2023-04-28 DIAGNOSIS — Z00.8 ENCOUNTER FOR OTHER GENERAL EXAMINATION: ICD-10-CM

## 2023-04-28 PROCEDURE — 74174 CTA ABD&PLVS W/CONTRAST: CPT | Mod: 26,MH

## 2023-04-28 PROCEDURE — 74174 CTA ABD&PLVS W/CONTRAST: CPT | Mod: MH

## 2023-05-24 ENCOUNTER — OUTPATIENT (OUTPATIENT)
Dept: OUTPATIENT SERVICES | Facility: HOSPITAL | Age: 73
LOS: 1 days | End: 2023-05-24
Payer: MEDICARE

## 2023-05-24 VITALS
OXYGEN SATURATION: 98 % | RESPIRATION RATE: 18 BRPM | HEIGHT: 62 IN | WEIGHT: 154.1 LBS | SYSTOLIC BLOOD PRESSURE: 155 MMHG | HEART RATE: 63 BPM | DIASTOLIC BLOOD PRESSURE: 64 MMHG | TEMPERATURE: 98 F

## 2023-05-24 DIAGNOSIS — Z29.9 ENCOUNTER FOR PROPHYLACTIC MEASURES, UNSPECIFIED: ICD-10-CM

## 2023-05-24 DIAGNOSIS — Z96.652 PRESENCE OF LEFT ARTIFICIAL KNEE JOINT: Chronic | ICD-10-CM

## 2023-05-24 DIAGNOSIS — Z01.818 ENCOUNTER FOR OTHER PREPROCEDURAL EXAMINATION: ICD-10-CM

## 2023-05-24 DIAGNOSIS — K55.1 CHRONIC VASCULAR DISORDERS OF INTESTINE: ICD-10-CM

## 2023-05-24 DIAGNOSIS — Z86.79 PERSONAL HISTORY OF OTHER DISEASES OF THE CIRCULATORY SYSTEM: ICD-10-CM

## 2023-05-24 LAB
A1C WITH ESTIMATED AVERAGE GLUCOSE RESULT: 5.6 % — SIGNIFICANT CHANGE UP (ref 4–5.6)
ANION GAP SERPL CALC-SCNC: 14 MMOL/L — SIGNIFICANT CHANGE UP (ref 5–17)
BLD GP AB SCN SERPL QL: NEGATIVE — SIGNIFICANT CHANGE UP
BUN SERPL-MCNC: 11 MG/DL — SIGNIFICANT CHANGE UP (ref 7–23)
CALCIUM SERPL-MCNC: 9.2 MG/DL — SIGNIFICANT CHANGE UP (ref 8.4–10.5)
CHLORIDE SERPL-SCNC: 103 MMOL/L — SIGNIFICANT CHANGE UP (ref 96–108)
CO2 SERPL-SCNC: 26 MMOL/L — SIGNIFICANT CHANGE UP (ref 22–31)
CREAT SERPL-MCNC: 0.7 MG/DL — SIGNIFICANT CHANGE UP (ref 0.5–1.3)
EGFR: 91 ML/MIN/1.73M2 — SIGNIFICANT CHANGE UP
ESTIMATED AVERAGE GLUCOSE: 114 MG/DL — SIGNIFICANT CHANGE UP (ref 68–114)
GLUCOSE SERPL-MCNC: 111 MG/DL — HIGH (ref 70–99)
HCT VFR BLD CALC: 47.7 % — HIGH (ref 34.5–45)
HGB BLD-MCNC: 15.5 G/DL — SIGNIFICANT CHANGE UP (ref 11.5–15.5)
MCHC RBC-ENTMCNC: 29.2 PG — SIGNIFICANT CHANGE UP (ref 27–34)
MCHC RBC-ENTMCNC: 32.5 GM/DL — SIGNIFICANT CHANGE UP (ref 32–36)
MCV RBC AUTO: 90 FL — SIGNIFICANT CHANGE UP (ref 80–100)
NRBC # BLD: 0 /100 WBCS — SIGNIFICANT CHANGE UP (ref 0–0)
PLATELET # BLD AUTO: 217 K/UL — SIGNIFICANT CHANGE UP (ref 150–400)
POTASSIUM SERPL-MCNC: 3.5 MMOL/L — SIGNIFICANT CHANGE UP (ref 3.5–5.3)
POTASSIUM SERPL-SCNC: 3.5 MMOL/L — SIGNIFICANT CHANGE UP (ref 3.5–5.3)
RBC # BLD: 5.3 M/UL — HIGH (ref 3.8–5.2)
RBC # FLD: 13.2 % — SIGNIFICANT CHANGE UP (ref 10.3–14.5)
RH IG SCN BLD-IMP: POSITIVE — SIGNIFICANT CHANGE UP
SODIUM SERPL-SCNC: 143 MMOL/L — SIGNIFICANT CHANGE UP (ref 135–145)
WBC # BLD: 9.77 K/UL — SIGNIFICANT CHANGE UP (ref 3.8–10.5)
WBC # FLD AUTO: 9.77 K/UL — SIGNIFICANT CHANGE UP (ref 3.8–10.5)

## 2023-05-24 PROCEDURE — 85027 COMPLETE CBC AUTOMATED: CPT

## 2023-05-24 PROCEDURE — 80048 BASIC METABOLIC PNL TOTAL CA: CPT

## 2023-05-24 PROCEDURE — G0463: CPT

## 2023-05-24 PROCEDURE — 86900 BLOOD TYPING SEROLOGIC ABO: CPT

## 2023-05-24 PROCEDURE — 83036 HEMOGLOBIN GLYCOSYLATED A1C: CPT

## 2023-05-24 PROCEDURE — 86850 RBC ANTIBODY SCREEN: CPT

## 2023-05-24 PROCEDURE — 86901 BLOOD TYPING SEROLOGIC RH(D): CPT

## 2023-05-24 RX ORDER — SODIUM CHLORIDE 9 MG/ML
3 INJECTION INTRAMUSCULAR; INTRAVENOUS; SUBCUTANEOUS EVERY 8 HOURS
Refills: 0 | Status: DISCONTINUED | OUTPATIENT
Start: 2023-06-19 | End: 2023-06-19

## 2023-05-24 RX ORDER — LANOLIN ALCOHOL/MO/W.PET/CERES
1 CREAM (GRAM) TOPICAL
Qty: 0 | Refills: 0 | DISCHARGE

## 2023-05-24 RX ORDER — LIDOCAINE HCL 20 MG/ML
0.2 VIAL (ML) INJECTION ONCE
Refills: 0 | Status: DISCONTINUED | OUTPATIENT
Start: 2023-06-19 | End: 2023-06-19

## 2023-05-24 RX ORDER — CHLORHEXIDINE GLUCONATE 213 G/1000ML
1 SOLUTION TOPICAL ONCE
Refills: 0 | Status: DISCONTINUED | OUTPATIENT
Start: 2023-06-19 | End: 2023-06-19

## 2023-05-24 RX ORDER — MAGNESIUM CARBONATE 54 MG/5 ML
150 LIQUID (ML) ORAL
Qty: 0 | Refills: 0 | DISCHARGE

## 2023-05-24 NOTE — H&P PST ADULT - MUSCULOSKELETAL
Patient to call surgeon today to discuss post op issues she is currently having. Patient to be seen in Emergency Room if abdominal pain persists, fevers occur, worsening symptoms occur, or vomiting.     Use medication as directed; urine culture pending.     Patient to increase fluids, rest, bland diet. Follow up with Specialist as soon as possible.     Patient declined further evaluation (labs, imagining, evaluation of surgical site, pelvic exam) today in office and was informed of all risks.   normal/decreased ROM/decreased ROM due to pain/strength 5/5 bilateral upper extremities/abnormal gait details…

## 2023-05-24 NOTE — H&P PST ADULT - PROBLEM SELECTOR PLAN 1
pt scheduled for Mesenteric Stent with Dr. Torres on 6/7/23  Pre-op instructions given, all questions answered.  Surgical Soap given.  Labs: CBC, BMP, T&S, A1C

## 2023-05-24 NOTE — H&P PST ADULT - NSICDXPASTSURGICALHX_GEN_ALL_CORE_FT
PAST SURGICAL HISTORY:  History of  Section     History of Tonsillectomy     History of Varicose Veins with surgery    Status post total left knee replacement 18

## 2023-05-24 NOTE — H&P PST ADULT - NSICDXPASTMEDICALHX_GEN_ALL_CORE_FT
PAST MEDICAL HISTORY:  Anxiety     Decreased ROM of left knee     Dyslipidemia     H/O repair of right rotator cuff     H/O vein stripping bilateral right 2006 and left 2016    History of abdominal aortic aneurysm     HTN (Hypertension)     Insomnia     Lumbar stenosis     OA (osteoarthritis) of knee     Obesity, Class II, BMI 35-39.9     Scoliosis      PAST MEDICAL HISTORY:  Anxiety     Decreased ROM of left knee     Dyslipidemia     H/O repair of right rotator cuff     H/O vein stripping bilateral right 2006 and left 2016    History of abdominal aortic aneurysm     History of varicose veins     History of vascular disorder     HTN (Hypertension)     Insomnia     Lumbar stenosis     OA (osteoarthritis) of knee     Obesity, Class II, BMI 35-39.9     Scoliosis

## 2023-05-24 NOTE — H&P PST ADULT - NSICDXFAMILYHX_GEN_ALL_CORE_FT
FAMILY HISTORY:  Father  Still living? No  Family history of myocardial infarction at age less than 60, Age at diagnosis: Age Unknown    Mother  Still living? No  Family history of stroke, Age at diagnosis: Age Unknown  Hypertension, Age at diagnosis: Age Unknown    Sibling  Still living? No  Family history of cancer in sister, Age at diagnosis: Age Unknown

## 2023-05-24 NOTE — H&P PST ADULT - HISTORY OF PRESENT ILLNESS
73  year old female former smoker (60 pack years) presents to PST prior to scheduled Mesenteric Stent with Dr. Torres on 6/7/23. PMhx HTN, murmur, Pre-diabetes (not on medication),Dyslipidemia, Gout, Varicose vein, OA s/p left knee replacement, Right shoulder Rotator cuff tear s/p rotator cuff repair, Scoliosis, Lumbar stenosis. PShx tonsillectomy. C/o loss appetite, increase belching, back pain. Pt state she has loss over 30 lbs in the past 3 months. Denies any constipation, or diarrhea, chest pain, palpitations, SOB, N/V, fever or chills.

## 2023-05-31 ENCOUNTER — APPOINTMENT (OUTPATIENT)
Dept: INTERNAL MEDICINE | Facility: CLINIC | Age: 73
End: 2023-05-31
Payer: MEDICARE

## 2023-05-31 VITALS
WEIGHT: 156 LBS | RESPIRATION RATE: 17 BRPM | OXYGEN SATURATION: 97 % | HEIGHT: 63 IN | TEMPERATURE: 97.6 F | HEART RATE: 62 BPM | SYSTOLIC BLOOD PRESSURE: 158 MMHG | DIASTOLIC BLOOD PRESSURE: 66 MMHG | BODY MASS INDEX: 27.64 KG/M2

## 2023-05-31 VITALS — DIASTOLIC BLOOD PRESSURE: 68 MMHG | SYSTOLIC BLOOD PRESSURE: 148 MMHG

## 2023-05-31 DIAGNOSIS — M54.50 LOW BACK PAIN, UNSPECIFIED: ICD-10-CM

## 2023-05-31 PROCEDURE — 99213 OFFICE O/P EST LOW 20 MIN: CPT

## 2023-05-31 RX ORDER — CHLORTHALIDONE 25 MG/1
25 TABLET ORAL
Qty: 90 | Refills: 0 | Status: DISCONTINUED | COMMUNITY
End: 2023-05-31

## 2023-05-31 RX ORDER — ATORVASTATIN CALCIUM 20 MG/1
20 TABLET, FILM COATED ORAL DAILY
Qty: 90 | Refills: 3 | Status: DISCONTINUED | COMMUNITY
Start: 2022-10-28 | End: 2023-05-31

## 2023-05-31 NOTE — HISTORY OF PRESENT ILLNESS
[No Pertinent Cardiac History] : no history of aortic stenosis, atrial fibrillation, coronary artery disease, recent myocardial infarction, or implantable device/pacemaker [No Pertinent Pulmonary History] : no history of asthma, COPD, sleep apnea, or smoking [(Patient denies any chest pain, claudication, dyspnea on exertion, orthopnea, palpitations or syncope)] : Patient denies any chest pain, claudication, dyspnea on exertion, orthopnea, palpitations or syncope [Unable to assess] : unable to assess [Chronic Anticoagulation] : no chronic anticoagulation [Chronic Kidney Disease] : no chronic kidney disease [Diabetes] : no diabetes [FreeTextEntry1] : mesenteric stent [FreeTextEntry2] : 6/7/23 [FreeTextEntry3] : Dr. Torres  [FreeTextEntry4] : 73 y.o. female, PMHx hypertension, preDM, hyperlipidemia, gout, adrenal mass, back pain, knee pain, hip pain, venous insufficiency.  \par Evaluated by vascular, Dr. Torres d/t finding of abdominal aortic aneurysm.  Has also been having GI symptoms - bloating, nausea, weight loss, dyspepsia, loss of appetite later in the day after eating.  Has been evaluated by GI with colonoscopy and endoscopy done 2-3 weeks ago - reports findings of ulcers, gastritis, +h.pylori, thinks essentially normal colonoscopy.  \par Now taking triple therapy for h.pylori, which she will complete on 6/6/23.  \par Follows with cardiology - Dr. Az Hawthorne [place] ([phone], [phone]) about once a year.  Believes she did an echocardiogram last year.  \par Fax preop to [phone].

## 2023-05-31 NOTE — REVIEW OF SYSTEMS
[Abdominal Pain] : abdominal pain [Nausea] : nausea [Heartburn] : heartburn [Joint Pain] : joint pain [Joint Stiffness] : joint stiffness [Back Pain] : back pain [Negative] : Respiratory [Constipation] : no constipation [Diarrhea] : diarrhea [Vomiting] : no vomiting [Dizziness] : no dizziness [Fainting] : no fainting [Unsteady Walking] : no ataxia [FreeTextEntry7] : see HPI

## 2023-05-31 NOTE — RESULTS/DATA
[] : results reviewed [Sinus Bradycardia] : sinus bradycardia [No Interval Change] : no interval change

## 2023-05-31 NOTE — ASSESSMENT
[High Risk Surgery - Intraperitoneal, Intrathoracic or Supringuinal Vascular Procedures] : High Risk Surgery - Intraperitoneal, Intrathoracic or Supringuinal Vascular Procedures - Yes (1) [Ischemic Heart Disease] : Ischemic Heart Disease - No (0) [Congestive Heart Failure] : Congestive Heart Failure - No (0) [Prior Cerebrovascular Accident or TIA] : Prior Cerebrovascular Accident or TIA - No (0) [Creatinine >= 2mg/dL (1 Point)] : Creatinine >= 2mg/dL - No (0) [Insulin-dependent Diabetic (1 Point)] : Insulin-dependent Diabetic - No (0) [Cardiology consultation] : Cardiology consultation [Continue medications as is] : Continue current medications [As per surgery] : as per surgery [1] : 1 , RCRI Class: II, Risk of Post-Op Cardiac Complications: 6.0%, 95% CI for Risk Estimate: 4.9% - 7.4% [FreeTextEntry4] : Unable to assess functional capacity d/t mobility issues 2/2 chronic knee and back pain.  Although no new symptoms, she does have a h/o poorly controlled hypertension, hyperlipidemia for which she has refused statin therapy.  She has a h/o mitral regurg with slight mumur.  Atherosclerosis noted on recent CTA of the abdomen.  \par Recommend follow up with her cardiologist for preop evaluation prior to vascular surgery.  \par

## 2023-05-31 NOTE — PHYSICAL EXAM
[Normal Rate] : normal rate  [Regular Rhythm] : with a regular rhythm [Normal S1, S2] : normal S1 and S2 [No Edema] : there was no peripheral edema [Grossly Normal Strength/Tone] : grossly normal strength/tone [Coordination Grossly Intact] : coordination grossly intact [No Focal Deficits] : no focal deficits [Normal Gait] : normal gait [Normal] : affect was normal and insight and judgment were intact [de-identified] : 2/6 murmur

## 2023-06-06 PROBLEM — Z86.79 PERSONAL HISTORY OF OTHER DISEASES OF THE CIRCULATORY SYSTEM: Chronic | Status: ACTIVE | Noted: 2023-05-24

## 2023-06-18 ENCOUNTER — TRANSCRIPTION ENCOUNTER (OUTPATIENT)
Age: 73
End: 2023-06-18

## 2023-06-19 ENCOUNTER — INPATIENT (INPATIENT)
Facility: HOSPITAL | Age: 73
LOS: 1 days | Discharge: HOME | DRG: 983 | End: 2023-06-21
Attending: SURGERY | Admitting: SURGERY
Payer: MEDICARE

## 2023-06-19 VITALS
TEMPERATURE: 98 F | DIASTOLIC BLOOD PRESSURE: 68 MMHG | SYSTOLIC BLOOD PRESSURE: 123 MMHG | HEIGHT: 62 IN | HEART RATE: 52 BPM | WEIGHT: 154.1 LBS | OXYGEN SATURATION: 98 % | RESPIRATION RATE: 16 BRPM

## 2023-06-19 DIAGNOSIS — Z01.818 ENCOUNTER FOR OTHER PREPROCEDURAL EXAMINATION: ICD-10-CM

## 2023-06-19 DIAGNOSIS — K55.1 CHRONIC VASCULAR DISORDERS OF INTESTINE: ICD-10-CM

## 2023-06-19 DIAGNOSIS — Z96.652 PRESENCE OF LEFT ARTIFICIAL KNEE JOINT: Chronic | ICD-10-CM

## 2023-06-19 LAB
ANION GAP SERPL CALC-SCNC: 14 MMOL/L — SIGNIFICANT CHANGE UP (ref 5–17)
BUN SERPL-MCNC: 16 MG/DL — SIGNIFICANT CHANGE UP (ref 7–23)
CALCIUM SERPL-MCNC: 8.6 MG/DL — SIGNIFICANT CHANGE UP (ref 8.4–10.5)
CHLORIDE SERPL-SCNC: 107 MMOL/L — SIGNIFICANT CHANGE UP (ref 96–108)
CO2 SERPL-SCNC: 20 MMOL/L — LOW (ref 22–31)
CREAT SERPL-MCNC: 0.75 MG/DL — SIGNIFICANT CHANGE UP (ref 0.5–1.3)
EGFR: 84 ML/MIN/1.73M2 — SIGNIFICANT CHANGE UP
GLUCOSE BLDC GLUCOMTR-MCNC: 112 MG/DL — HIGH (ref 70–99)
GLUCOSE SERPL-MCNC: 140 MG/DL — HIGH (ref 70–99)
HCT VFR BLD CALC: 42.7 % — SIGNIFICANT CHANGE UP (ref 34.5–45)
HGB BLD-MCNC: 14.5 G/DL — SIGNIFICANT CHANGE UP (ref 11.5–15.5)
MAGNESIUM SERPL-MCNC: 2.1 MG/DL — SIGNIFICANT CHANGE UP (ref 1.6–2.6)
MCHC RBC-ENTMCNC: 30.1 PG — SIGNIFICANT CHANGE UP (ref 27–34)
MCHC RBC-ENTMCNC: 34 GM/DL — SIGNIFICANT CHANGE UP (ref 32–36)
MCV RBC AUTO: 88.8 FL — SIGNIFICANT CHANGE UP (ref 80–100)
NRBC # BLD: 0 /100 WBCS — SIGNIFICANT CHANGE UP (ref 0–0)
PHOSPHATE SERPL-MCNC: 4.3 MG/DL — SIGNIFICANT CHANGE UP (ref 2.5–4.5)
PLATELET # BLD AUTO: 164 K/UL — SIGNIFICANT CHANGE UP (ref 150–400)
POTASSIUM SERPL-MCNC: 4 MMOL/L — SIGNIFICANT CHANGE UP (ref 3.5–5.3)
POTASSIUM SERPL-SCNC: 4 MMOL/L — SIGNIFICANT CHANGE UP (ref 3.5–5.3)
RBC # BLD: 4.81 M/UL — SIGNIFICANT CHANGE UP (ref 3.8–5.2)
RBC # FLD: 13.4 % — SIGNIFICANT CHANGE UP (ref 10.3–14.5)
SODIUM SERPL-SCNC: 141 MMOL/L — SIGNIFICANT CHANGE UP (ref 135–145)
WBC # BLD: 10.39 K/UL — SIGNIFICANT CHANGE UP (ref 3.8–10.5)
WBC # FLD AUTO: 10.39 K/UL — SIGNIFICANT CHANGE UP (ref 3.8–10.5)

## 2023-06-19 DEVICE — SHEATH INTRODUCER TERUMO PINNACLE PERIPHERAL 6FR X 10CM: Type: IMPLANTABLE DEVICE | Status: FUNCTIONAL

## 2023-06-19 DEVICE — CATH QUICK CROSS .018X135CM: Type: IMPLANTABLE DEVICE | Status: FUNCTIONAL

## 2023-06-19 DEVICE — IMPLANTABLE DEVICE: Type: IMPLANTABLE DEVICE | Status: FUNCTIONAL

## 2023-06-19 DEVICE — BLLN STERLING MONORAIL 5X40MMX135CM: Type: IMPLANTABLE DEVICE | Status: FUNCTIONAL

## 2023-06-19 DEVICE — GUIDEWIRE V-18 CONTROLWIRE STRAIGHT .018" X 300CM TAPER 8CM: Type: IMPLANTABLE DEVICE | Status: FUNCTIONAL

## 2023-06-19 DEVICE — CATH ANGIO GLIDECATH ANGLE 5FR X 100CM: Type: IMPLANTABLE DEVICE | Status: FUNCTIONAL

## 2023-06-19 DEVICE — GUIDEWIRE BENTSON 0.035" X 145CM: Type: IMPLANTABLE DEVICE | Status: FUNCTIONAL

## 2023-06-19 DEVICE — CATH ANG PIGVSC 0.035IN 5FR: Type: IMPLANTABLE DEVICE | Status: FUNCTIONAL

## 2023-06-19 DEVICE — SURGIFOAM PAD 8CM X 12.5CM X 10MM (100): Type: IMPLANTABLE DEVICE | Status: FUNCTIONAL

## 2023-06-19 DEVICE — GUIDEWIRE RADIFOCUS GLIDEWIRE ANGLED 0.035" X 260CM STIFF: Type: IMPLANTABLE DEVICE | Status: FUNCTIONAL

## 2023-06-19 DEVICE — GUIDEWIRE RADIFOCUS GLIDEWIRE STANDARD ANGLED TIP 0.035" X 260CM: Type: IMPLANTABLE DEVICE | Status: FUNCTIONAL

## 2023-06-19 DEVICE — MICRO-INTRODUCER KIT 5FR 60CM NITINOL TC TIP 7CM ECHOGENIC REGULAR: Type: IMPLANTABLE DEVICE | Status: FUNCTIONAL

## 2023-06-19 RX ORDER — CLOPIDOGREL BISULFATE 75 MG/1
300 TABLET, FILM COATED ORAL ONCE
Refills: 0 | Status: COMPLETED | OUTPATIENT
Start: 2023-06-19 | End: 2023-06-19

## 2023-06-19 RX ORDER — LOSARTAN POTASSIUM 100 MG/1
100 TABLET, FILM COATED ORAL DAILY
Refills: 0 | Status: DISCONTINUED | OUTPATIENT
Start: 2023-06-19 | End: 2023-06-21

## 2023-06-19 RX ORDER — ACETAMINOPHEN 500 MG
650 TABLET ORAL EVERY 6 HOURS
Refills: 0 | Status: DISCONTINUED | OUTPATIENT
Start: 2023-06-19 | End: 2023-06-21

## 2023-06-19 RX ORDER — ASPIRIN/CALCIUM CARB/MAGNESIUM 324 MG
81 TABLET ORAL DAILY
Refills: 0 | Status: DISCONTINUED | OUTPATIENT
Start: 2023-06-19 | End: 2023-06-21

## 2023-06-19 RX ORDER — OMEPRAZOLE MAGNESIUM, AMOXICILLIN AND RIFABUTIN 10; 250; 12.5 MG/1; MG/1; MG/1
4 CAPSULE, DELAYED RELEASE ORAL
Refills: 0 | DISCHARGE

## 2023-06-19 RX ORDER — CLOPIDOGREL BISULFATE 75 MG/1
300 TABLET, FILM COATED ORAL DAILY
Refills: 0 | Status: DISCONTINUED | OUTPATIENT
Start: 2023-06-19 | End: 2023-06-19

## 2023-06-19 RX ORDER — AMLODIPINE BESYLATE AND OLMESARTRAN MEDOXOMIL 10; 40 MG/1; MG/1
1 TABLET, FILM COATED ORAL
Refills: 0 | DISCHARGE

## 2023-06-19 RX ORDER — CLOPIDOGREL BISULFATE 75 MG/1
75 TABLET, FILM COATED ORAL DAILY
Refills: 0 | Status: DISCONTINUED | OUTPATIENT
Start: 2023-06-20 | End: 2023-06-21

## 2023-06-19 RX ORDER — OXYCODONE HYDROCHLORIDE 5 MG/1
5 TABLET ORAL EVERY 4 HOURS
Refills: 0 | Status: DISCONTINUED | OUTPATIENT
Start: 2023-06-19 | End: 2023-06-21

## 2023-06-19 RX ORDER — CEFAZOLIN SODIUM 1 G
2000 VIAL (EA) INJECTION ONCE
Refills: 0 | Status: COMPLETED | OUTPATIENT
Start: 2023-06-19 | End: 2023-06-19

## 2023-06-19 RX ORDER — CHOLECALCIFEROL (VITAMIN D3) 125 MCG
1000 CAPSULE ORAL DAILY
Refills: 0 | Status: DISCONTINUED | OUTPATIENT
Start: 2023-06-19 | End: 2023-06-21

## 2023-06-19 RX ORDER — CHOLECALCIFEROL (VITAMIN D3) 125 MCG
1 CAPSULE ORAL
Qty: 0 | Refills: 0 | DISCHARGE

## 2023-06-19 RX ORDER — AMLODIPINE BESYLATE 2.5 MG/1
10 TABLET ORAL DAILY
Refills: 0 | Status: DISCONTINUED | OUTPATIENT
Start: 2023-06-19 | End: 2023-06-21

## 2023-06-19 RX ORDER — GLUCOSAMINE HCL/CHONDROITIN SU 500-400 MG
1 CAPSULE ORAL
Qty: 0 | Refills: 0 | DISCHARGE

## 2023-06-19 RX ORDER — HEPARIN SODIUM 5000 [USP'U]/ML
5000 INJECTION INTRAVENOUS; SUBCUTANEOUS EVERY 8 HOURS
Refills: 0 | Status: DISCONTINUED | OUTPATIENT
Start: 2023-06-19 | End: 2023-06-21

## 2023-06-19 RX ADMIN — Medication 81 MILLIGRAM(S): at 19:38

## 2023-06-19 RX ADMIN — CLOPIDOGREL BISULFATE 300 MILLIGRAM(S): 75 TABLET, FILM COATED ORAL at 18:38

## 2023-06-19 RX ADMIN — Medication 650 MILLIGRAM(S): at 21:30

## 2023-06-19 RX ADMIN — HEPARIN SODIUM 5000 UNIT(S): 5000 INJECTION INTRAVENOUS; SUBCUTANEOUS at 21:29

## 2023-06-19 RX ADMIN — Medication 650 MILLIGRAM(S): at 22:00

## 2023-06-19 NOTE — BRIEF OPERATIVE NOTE - NSICDXBRIEFPREOP_GEN_ALL_CORE_FT
PRE-OP DIAGNOSIS:  Chronic mesenteric ischemia 19-Jun-2023 17:20:11  Larry Galo  
LABS:                        9.3    12.23 )-----------( 546      ( 05 Jul 2022 23:02 )             29.6     07-05    137  |  102  |  21<H>  ----------------------------<  160<H>  4.8   |  27  |  0.85    Ca    8.9      05 Jul 2022 23:02    TPro  7.0  /  Alb  2.1<L>  /  TBili  <0.1<L>  /  DBili  x   /  AST  18  /  ALT  25  /  AlkPhos  112  07-05        LIVER FUNCTIONS - ( 05 Jul 2022 23:02 )  Alb: 2.1 g/dL / Pro: 7.0 g/dL / ALK PHOS: 112 U/L / ALT: 25 U/L DA / AST: 18 U/L / GGT: x

## 2023-06-19 NOTE — PRE-ANESTHESIA EVALUATION ADULT - NSANTHPMHFT_GEN_ALL_CORE
73 F hx of 60 pack year smoking, quit, HTN, murmur, HLD, Gout, Varicose vein, OA s/p left knee replacement, scoliosis, lumbar spinal stenosis, AAA, mesenteric atherosclerosis

## 2023-06-19 NOTE — CHART NOTE - NSCHARTNOTEFT_GEN_A_CORE
Post Operative Note  Patient: STEPHIE DAVIS 73y (1950) Female   MRN: [id number]  Location: [facility] PACU 25  Visit: 06-19-23 Inpatient  Date: 06-19-23 @ 21:00    Procedure: S/P Celiac stent placement w L brachial access    Subjective: Patient seen and examined post operatively. Reports pain as controlled. Denies nausea, vomiting, fever, chills, chest pain, SOB, cough. Denies L hand pain/tingling numbness. Endorses some soreness at the gluteal cleft.       Objective:  Vitals: T(F): 96.8 (06-19-23 @ 16:50), Max: 97.7 (06-19-23 @ 10:27)  HR: 63 (06-19-23 @ 20:30)  BP: 131/63 (06-19-23 @ 20:30) (96/48 - 138/62)  RR: 20 (06-19-23 @ 20:30)  SpO2: 97% (06-19-23 @ 20:30)  Vent Settings:     In:   06-19-23 @ 07:01  -  06-19-23 @ 21:00  --------------------------------------------------------  IN: 720 mL      IV Fluids: cholecalciferol 1000 Unit(s) Oral daily      Out:   06-19-23 @ 07:01  -  06-19-23 @ 21:00  --------------------------------------------------------  OUT: 170 mL      EBL:     Voided Urine:   06-19-23 @ 07:01  -  06-19-23 @ 21:00  --------------------------------------------------------  OUT: 170 mL      Cherry Catheter: yes         Physical Examination:  General: NAD, resting comfortably in bed  HEENT: Normocephalic atraumatic  Respiratory: Nonlabored respirations, normal CW expansion.  Cardio: S1S2, regular rate and rhythm.  Abdomen: nondistended, nontender. Erythema noted over gluteal cleft with allevyn pad in place. No skin breakdown seen,   Vascular: extremities are warm and well perfused. Neurovasc intact in LUE, radial and ulnar pulses palpable.     Imaging:  No post-op imaging studies    Assessment:  73yFemale patient S/P celiac stent placement    Plan:  - Plavix, ASA started tonight  - Pain control PRN  - Diet: regular  - Cherry  - Allevyn in place for stage 1 decubital ulcer  - home meds  - DVT ppx: SCD's & SQH    Date/Time: 06-19-23 @ 21:00

## 2023-06-20 ENCOUNTER — TRANSCRIPTION ENCOUNTER (OUTPATIENT)
Age: 73
End: 2023-06-20

## 2023-06-20 LAB
AMYLASE P1 CFR SERPL: 41 U/L — SIGNIFICANT CHANGE UP (ref 25–125)
ANION GAP SERPL CALC-SCNC: 14 MMOL/L — SIGNIFICANT CHANGE UP (ref 5–17)
BUN SERPL-MCNC: 17 MG/DL — SIGNIFICANT CHANGE UP (ref 7–23)
CALCIUM SERPL-MCNC: 8.6 MG/DL — SIGNIFICANT CHANGE UP (ref 8.4–10.5)
CHLORIDE SERPL-SCNC: 104 MMOL/L — SIGNIFICANT CHANGE UP (ref 96–108)
CO2 SERPL-SCNC: 21 MMOL/L — LOW (ref 22–31)
CREAT SERPL-MCNC: 0.87 MG/DL — SIGNIFICANT CHANGE UP (ref 0.5–1.3)
EGFR: 70 ML/MIN/1.73M2 — SIGNIFICANT CHANGE UP
GLUCOSE SERPL-MCNC: 123 MG/DL — HIGH (ref 70–99)
HCT VFR BLD CALC: 39.4 % — SIGNIFICANT CHANGE UP (ref 34.5–45)
HGB BLD-MCNC: 13.5 G/DL — SIGNIFICANT CHANGE UP (ref 11.5–15.5)
MAGNESIUM SERPL-MCNC: 2.2 MG/DL — SIGNIFICANT CHANGE UP (ref 1.6–2.6)
MCHC RBC-ENTMCNC: 30.3 PG — SIGNIFICANT CHANGE UP (ref 27–34)
MCHC RBC-ENTMCNC: 34.3 GM/DL — SIGNIFICANT CHANGE UP (ref 32–36)
MCV RBC AUTO: 88.3 FL — SIGNIFICANT CHANGE UP (ref 80–100)
NRBC # BLD: 0 /100 WBCS — SIGNIFICANT CHANGE UP (ref 0–0)
PHOSPHATE SERPL-MCNC: 4.1 MG/DL — SIGNIFICANT CHANGE UP (ref 2.5–4.5)
PLATELET # BLD AUTO: 159 K/UL — SIGNIFICANT CHANGE UP (ref 150–400)
POTASSIUM SERPL-MCNC: 4.3 MMOL/L — SIGNIFICANT CHANGE UP (ref 3.5–5.3)
POTASSIUM SERPL-SCNC: 4.3 MMOL/L — SIGNIFICANT CHANGE UP (ref 3.5–5.3)
RBC # BLD: 4.46 M/UL — SIGNIFICANT CHANGE UP (ref 3.8–5.2)
RBC # FLD: 13.3 % — SIGNIFICANT CHANGE UP (ref 10.3–14.5)
SODIUM SERPL-SCNC: 139 MMOL/L — SIGNIFICANT CHANGE UP (ref 135–145)
WBC # BLD: 9.99 K/UL — SIGNIFICANT CHANGE UP (ref 3.8–10.5)
WBC # FLD AUTO: 9.99 K/UL — SIGNIFICANT CHANGE UP (ref 3.8–10.5)

## 2023-06-20 PROCEDURE — 99221 1ST HOSP IP/OBS SF/LOW 40: CPT

## 2023-06-20 RX ADMIN — HEPARIN SODIUM 5000 UNIT(S): 5000 INJECTION INTRAVENOUS; SUBCUTANEOUS at 21:42

## 2023-06-20 RX ADMIN — Medication 650 MILLIGRAM(S): at 04:00

## 2023-06-20 RX ADMIN — CLOPIDOGREL BISULFATE 75 MILLIGRAM(S): 75 TABLET, FILM COATED ORAL at 14:05

## 2023-06-20 RX ADMIN — Medication 1000 UNIT(S): at 14:04

## 2023-06-20 RX ADMIN — Medication 650 MILLIGRAM(S): at 17:57

## 2023-06-20 RX ADMIN — Medication 650 MILLIGRAM(S): at 17:27

## 2023-06-20 RX ADMIN — Medication 81 MILLIGRAM(S): at 14:04

## 2023-06-20 RX ADMIN — HEPARIN SODIUM 5000 UNIT(S): 5000 INJECTION INTRAVENOUS; SUBCUTANEOUS at 14:05

## 2023-06-20 RX ADMIN — HEPARIN SODIUM 5000 UNIT(S): 5000 INJECTION INTRAVENOUS; SUBCUTANEOUS at 06:08

## 2023-06-20 RX ADMIN — Medication 650 MILLIGRAM(S): at 05:00

## 2023-06-20 NOTE — DISCHARGE NOTE PROVIDER - NSDCCPCAREPLAN_GEN_ALL_CORE_FT
PRINCIPAL DISCHARGE DIAGNOSIS  Diagnosis: History of vascular disorder  Assessment and Plan of Treatment: WOUND CARE: Keep dressings clean, dry and intact, replace dry dressings as needed.   BATHING: Please do not submerge wound underwater. No swimming pools, no hot tubs, no tub baths. You may shower and/or sponge bathe in 24 hours. Remove outer dressings prior to showering. Let soapy water run over incisions. DO NOT scrub or soak incision sites. Pat dry.   ACTIVITY: No heavy lifting more than 10-15lbs or straining. Otherwise, you may return to your usual level of physical activity. You may complete your daily activities. Take frequent walks.  DIET: Regular diet  NOTIFY YOUR SURGEON IF: You have any bleeding that does not stop, any pus draining from your wound, any fever (over 100.4 F) or chills, persistent nausea/vomiting with inability to tolerate food or liquids, persistent diarrhea, or if your pain is not controlled on your discharge pain medications.  FOLLOW-UP:  1. Please call to make a follow-up appointment with Dr. Torres within one week of discharge   2. Please follow up with your primary care physician in one week regarding your hospitalization.

## 2023-06-20 NOTE — PROGRESS NOTE ADULT - SUBJECTIVE AND OBJECTIVE BOX
VASCULAR SURGERY DAILY PROGRESS NOTE:     SUBJECTIVE/ROS:   Overnight: no acute events   Patient seen and evaluated on AM rounds.   Patient otherwise denies nausea, vomiting, chest pain, shortness of breath     OBJECTIVE:  Vital Signs Last 24 Hrs  T(C): 36.2 (20 Jun 2023 04:00), Max: 36.5 (19 Jun 2023 10:27)  T(F): 97.2 (20 Jun 2023 04:00), Max: 97.7 (19 Jun 2023 10:27)  HR: 54 (20 Jun 2023 04:00) (52 - 71)  BP: 113/50 (20 Jun 2023 04:00) (96/48 - 142/63)  BP(mean): 72 (20 Jun 2023 04:00) (68 - 92)  RR: 14 (20 Jun 2023 04:00) (12 - 20)  SpO2: 99% (20 Jun 2023 04:00) (94% - 100%)    Parameters below as of 20 Jun 2023 04:00  Patient On (Oxygen Delivery Method): room air      I&O's Detail    19 Jun 2023 07:01  -  20 Jun 2023 06:03  --------------------------------------------------------  IN:    Oral Fluid: 1200 mL  Total IN: 1200 mL    OUT:    Indwelling Catheter - Urethral (mL): 805 mL  Total OUT: 805 mL    Total NET: 395 mL        Daily Height in cm: 157.48 (19 Jun 2023 11:50)    Daily   MEDICATIONS  (STANDING):  acetaminophen     Tablet .. 650 milliGRAM(s) Oral every 6 hours  amLODIPine   Tablet 10 milliGRAM(s) Oral daily  aspirin  chewable 81 milliGRAM(s) Oral daily  cholecalciferol 1000 Unit(s) Oral daily  clopidogrel Tablet 75 milliGRAM(s) Oral daily  heparin   Injectable 5000 Unit(s) SubCutaneous every 8 hours  losartan 100 milliGRAM(s) Oral daily    MEDICATIONS  (PRN):  oxyCODONE    IR 5 milliGRAM(s) Oral every 4 hours PRN moderate to severe pain (4-10)      Labs:                        14.5   10.39 )-----------( 164      ( 19 Jun 2023 18:32 )             42.7     06-19    141  |  107  |  16  ----------------------------<  140<H>  4.0   |  20<L>  |  0.75    Ca    8.6      19 Jun 2023 18:32  Phos  4.3     06-19  Mg     2.1     06-19          Physical Exam:  General: NAD, resting comfortably in bed  HEENT: Normocephalic atraumatic  Respiratory: Nonlabored respirations, normal CW expansion.  Cardio: S1S2, regular rate and rhythm.  Abdomen: nondistended, nontender. Erythema noted over gluteal cleft with allevyn pad in place. No skin breakdown seen,   Vascular: extremities are warm and well perfused. Neurovasc intact in LUE, radial and ulnar pulses palpable.          VASCULAR SURGERY DAILY PROGRESS NOTE:     SUBJECTIVE/ROS:   Overnight: no acute events   Patient seen and evaluated on AM rounds.   Patient otherwise denies nausea, vomiting, chest pain, shortness of breath     OBJECTIVE:  Vital Signs Last 24 Hrs  T(C): 36.2 (20 Jun 2023 04:00), Max: 36.5 (19 Jun 2023 10:27)  T(F): 97.2 (20 Jun 2023 04:00), Max: 97.7 (19 Jun 2023 10:27)  HR: 54 (20 Jun 2023 04:00) (52 - 71)  BP: 113/50 (20 Jun 2023 04:00) (96/48 - 142/63)  BP(mean): 72 (20 Jun 2023 04:00) (68 - 92)  RR: 14 (20 Jun 2023 04:00) (12 - 20)  SpO2: 99% (20 Jun 2023 04:00) (94% - 100%)    Parameters below as of 20 Jun 2023 04:00  Patient On (Oxygen Delivery Method): room air      I&O's Detail    19 Jun 2023 07:01  -  20 Jun 2023 06:03  --------------------------------------------------------  IN:    Oral Fluid: 1200 mL  Total IN: 1200 mL    OUT:    Indwelling Catheter - Urethral (mL): 805 mL  Total OUT: 805 mL    Total NET: 395 mL        Daily Height in cm: 157.48 (19 Jun 2023 11:50)    Daily   MEDICATIONS  (STANDING):  acetaminophen     Tablet .. 650 milliGRAM(s) Oral every 6 hours  amLODIPine   Tablet 10 milliGRAM(s) Oral daily  aspirin  chewable 81 milliGRAM(s) Oral daily  cholecalciferol 1000 Unit(s) Oral daily  clopidogrel Tablet 75 milliGRAM(s) Oral daily  heparin   Injectable 5000 Unit(s) SubCutaneous every 8 hours  losartan 100 milliGRAM(s) Oral daily    MEDICATIONS  (PRN):  oxyCODONE    IR 5 milliGRAM(s) Oral every 4 hours PRN moderate to severe pain (4-10)      Labs:                        14.5   10.39 )-----------( 164      ( 19 Jun 2023 18:32 )             42.7     06-19    141  |  107  |  16  ----------------------------<  140<H>  4.0   |  20<L>  |  0.75    Ca    8.6      19 Jun 2023 18:32  Phos  4.3     06-19  Mg     2.1     06-19          Physical Exam:  General: NAD, resting comfortably in bed  HEENT: Normocephalic atraumatic  Respiratory: Nonlabored respirations,   Abdomen: nondistended, nontender. Erythema noted over gluteal cleft with allevyn pad in place. No skin breakdown seen  Vascular: extremities are warm and well perfused. Neurovasc intact in LUE, radial and ulnar pulses palpable.

## 2023-06-20 NOTE — DISCHARGE NOTE PROVIDER - HOSPITAL COURSE
HPI:  73  year old female former smoker (60 pack years) presents to PST prior to scheduled Mesenteric Stent with Dr. Torres on 6/7/23. PMhx HTN, murmur, Pre-diabetes (not on medication),Dyslipidemia, Gout, Varicose vein, OA s/p left knee replacement, Right shoulder Rotator cuff tear s/p rotator cuff repair, Scoliosis, Lumbar stenosis. PShx tonsillectomy. C/o loss appetite, increase belching, back pain. Pt state she has loss over 30 lbs in the past 3 months. Denies any constipation, or diarrhea, chest pain, palpitations, SOB, N/V, fever or chills.   Patient presented for scheduled procedure. Underwent left brachial cut down with mesenteric arteriogram and celiac stenting without complication. Patient was transferred to PACU then to floor for observation. Postop, pain well controlled. Diet advanced as tolerated. Home meds restarted. Ambulation and incentive spirometry encouraged. Labs trended daily. On POD1, Cherry removed with successful trial of void to follow. Wound care consulted for sacral wound. Patient had CTA prior to discharge which showed _________. At time of discharge, patient hemodynamically stable. HPI:  73  year old female former smoker (60 pack years) presents to PST prior to scheduled Mesenteric Stent with Dr. Torres on 6/7/23. PMhx HTN, murmur, Pre-diabetes (not on medication),Dyslipidemia, Gout, Varicose vein, OA s/p left knee replacement, Right shoulder Rotator cuff tear s/p rotator cuff repair, Scoliosis, Lumbar stenosis. PShx tonsillectomy. C/o loss appetite, increase belching, back pain. Pt state she has loss over 30 lbs in the past 3 months. Denies any constipation, or diarrhea, chest pain, palpitations, SOB, N/V, fever or chills.   Patient presented for scheduled procedure. Underwent left brachial cut down with mesenteric arteriogram and celiac stenting without complication. Patient was transferred to PACU then to floor for observation. Postop, pain well controlled. Diet advanced as tolerated. Home meds restarted. Ambulation and incentive spirometry encouraged. Labs trended daily. On POD1, Cherry removed with successful trial of void to follow. Wound care consulted for sacral wound. Patient had CTA prior to discharge. At time of discharge, patient hemodynamically stable.

## 2023-06-20 NOTE — CONSULT NOTE ADULT - SUBJECTIVE AND OBJECTIVE BOX
Wound Surgery Consult Note:    HPI:  73  year old female former smoker (60 pack years) presents to PST prior to scheduled Mesenteric Stent with Dr. Torres on 23. PMhx HTN, murmur, Pre-diabetes (not on medication),Dyslipidemia, Gout, Varicose vein, OA s/p left knee replacement, Right shoulder Rotator cuff tear s/p rotator cuff repair, Scoliosis, Lumbar stenosis. PShx tonsillectomy. C/o loss appetite, increase belching, back pain. Pt state she has loss over 30 lbs in the past 3 months. Denies any constipation, or diarrhea, chest pain, palpitations, SOB, N/V, fever or chills.  (24 May 2023 08:32)    Request for wound care consult for sacral/buttocks skin damage recieved from nursing. Ms. Fields stated that she had some pain in her buttocks and sacrum yesterday but that it is not painful today. She is ambulatory and continent. No skin injury noted at this time.    Principles of pressure injury prevention and treatment including but not limited to offloading and turning and repositioning review with patient.     PAST MEDICAL & SURGICAL HISTORY:  HTN (Hypertension)  Dyslipidemia  Anxiety  Scoliosis  Lumbar stenosis  H/O vein stripping  bilateral right  and left   Insomnia  OA (osteoarthritis) of knee  Decreased ROM of left knee  Obesity, Class II, BMI 35-39.9  History of abdominal aortic aneurysm  H/O repair of right rotator cuff  History of vascular disorder  History of varicose veins  History of  Section,   History of Tonsillectomy,   History of Varicose Veins, with surgery  Status post total left knee replacement, 18    REVIEW OF SYSTEMS:    Constitutional:     [ ] negative [ ] fevers [ ] chills [ x] weight loss [ ] weight gain  [ ] fatigue  HEENT:                  [x ] negative [ ] dry eyes [ ] eye irritation [ ] postnasal drip [ ] nasal congestion   CV:                         [ x] negative  [ ] chest pain [ ] orthopnea [ ] palpitations [ ] tachycardia  Resp:                     [ x] negative [ ] cough [ ] shortness of breath [ ] dyspnea [ ] wheezing [ ] sputum [ ] hemoptysis  GI:                          [ ] negative [ ] nausea [ ] vomiting [ ] diarrhea [ ] constipation [ x] abd pain [ ] dysphagia [ ] incontinent of bowel  :                        [ x] negative [ ] dysuria [ ] nocturia [ ] hematuria [ ] increased urinary frequency [ ] incontinent of urine  Musculoskeletal:     [ ] negative [ ] back pain [ ] myalgias [ ] arthralgias [ ] fracture [x ] ambulatory  [ ] non-ambulatory  Skin:                       [x ] negative [ ] rash [ ] itch [ ] wound [ ] skin discoloration  Neurological:        [x ] negative [ ] headache [ ] dizziness [ ] syncope [ ] weakness [ ] numbness  Psychiatric:           [x ] negative [ ] anxiety [ ] depression  Endocrine:            [x ] negative [ ] diabetes [ ] thyroid problem  Heme/Lymph:      [x ] negative [ ] anemia [ ] bleeding problem  Allergic/Immune: [x ] negative [ ] itchy eyes [ ] nasal discharge [ ] hives [ ] angioedema    [x ] All other systems negative    MEDICATIONS  (STANDING):  acetaminophen     Tablet .. 650 milliGRAM(s) Oral every 6 hours  amLODIPine   Tablet 10 milliGRAM(s) Oral daily  aspirin  chewable 81 milliGRAM(s) Oral daily  cholecalciferol 1000 Unit(s) Oral daily  clopidogrel Tablet 75 milliGRAM(s) Oral daily  heparin   Injectable 5000 Unit(s) SubCutaneous every 8 hours  losartan 100 milliGRAM(s) Oral daily    MEDICATIONS  (PRN):  oxyCODONE    IR 5 milliGRAM(s) Oral every 4 hours PRN moderate to severe pain (4-10)    Allergies    No Known Allergies    Intolerances    SOCIAL HISTORY:  , Denies smoking, ETOH, drugs    FAMILY HISTORY:  Hypertension (Mother)    Family history of stroke (Mother)    Family history of myocardial infarction at age less than 60 (Father)    Family history of cancer in sister (Sibling)    Vital Signs Last 24 Hrs  T(C): 36.5 (2023 10:00), Max: 36.8 (2023 07:00)  T(F): 97.7 (2023 10:00), Max: 98.2 (2023 07:00)  HR: 54 (2023 10:00) (52 - 71)  BP: 106/60 (2023 10:00) (96/48 - 142/63)  BP(mean): 78 (2023 06:00) (68 - 92)  RR: 18 (2023 10:00) (12 - 20)  SpO2: 96% (2023 10:00) (94% - 100%)    Parameters below as of 2023 10:00  Patient On (Oxygen Delivery Method): room air    Physical Exam:  General: Alert, WN  Ophthamology: sclera clear  ENMT: moist mucous membranes, trachea midline  Respiratory: equal chest rise with respirations  Gastrointestinal: soft NT/ND  Neurology: verbal, following commands  Psych: calm, appropriate  Musculoskeletal: no contractures  Vascular: BLE edema equal  Skin:  Sacral/bilateral buttocks with 100% intact, non-discolored skin, no drainage  No odor, erythema, increased warmth, tenderness, induration, fluctuance    LABS:      139  |  104  |  17  ----------------------------<  123<H>  4.3   |  21<L>  |  0.87    Ca    8.6      2023 06:22  Phos  4.1       Mg     2.2                                 13.5   9.99  )-----------( 159      ( 2023 06:22 )             39.4

## 2023-06-20 NOTE — CONSULT NOTE ADULT - ASSESSMENT
Impression:    Moisture Dermatitis  Pressure Injury Prophylaxis    Recommend:  1.) topical therapy: sacral/buttock injury - cleanse with incontinence cleanser, pat dry, apply Alex ointment BID and PRN for incontinent episodes  2.) Incontinence Management - incontinence cleanser, pads, pericare BID  3.) Maintain on an alternating air with low air loss surface  4.) Turn and reposition Q 2 hours  5.) Nutrition optimization     Care as per medicine. Will not actively follow but will remain available. Please recall for new issues or deterioration.  Upon discharge f/u as outpatient at [facility] [address] [place] [address] [phone]  Thank you for this consult  Dariela Deutsch, NP-C, CWOCN via TEAMS

## 2023-06-20 NOTE — PROGRESS NOTE ADULT - ASSESSMENT
73F former smoker (60 pack years) w/ PMHx HTN, murmur, Pre-diabetes (not on medication),Dyslipidemia, Gout, Varicose vein, OA s/p left knee replacement, Right shoulder Rotator cuff tear s/p rotator cuff repair, Scoliosis, Lumbar stenosis most recently w/ d/o loss appetite, increase belching, back pain. Pt state she has loss over 30 lbs in the past 3 months. Patient now s/p celiac artery stent placement     Plan:  - d/c martell, f/u TOV   - Diet: Regular  - Continue ASA, plavix   - Allevyn in place for stage 1 decubital ulcer  - Continue home medications   - DVT ppx: SQH  - Dispo: pending       Vascular Surgery   [id number]  73F former smoker (60 pack years) w/ PMHx HTN, murmur, Pre-diabetes (not on medication),Dyslipidemia, Gout, Varicose vein, OA s/p left knee replacement, Right shoulder Rotator cuff tear s/p rotator cuff repair, Scoliosis, Lumbar stenosis most recently w/ d/o loss appetite, increase belching, back pain. Pt state she has loss over 30 lbs in the past 3 months. Patient now s/p celiac artery stent placement     Plan:  - d/c martell, f/u TOV   - Diet: Regular  - Continue ASA, plavix   - Allevyn in place for stage 1 decubital ulcer  - Continue home medications   - DVT ppx: SQH  - CTA tomorrow  - Dispo: pending       Vascular Surgery   [id number]

## 2023-06-20 NOTE — DISCHARGE NOTE PROVIDER - NSDCMRMEDTOKEN_GEN_ALL_CORE_FT
amlodipine-olmesartan 10 mg-40 mg oral tablet: 1 orally  glucosamine-chondroitin 500 mg-400 mg oral tablet: 1 tab(s) orally once a day  Talicia oral delayed release capsule: 4 orally  Vitamin D3 1000 intl units oral tablet: 1 tab(s) orally once a day   acetaminophen 325 mg oral tablet: 2 tab(s) orally every 6 hours as needed for mild to moderate pain/discomfort  amlodipine-olmesartan 10 mg-40 mg oral tablet: 1 orally  aspirin 81 mg oral tablet, chewable: 1 tab(s) orally once a day  clopidogrel 75 mg oral tablet: 1 tab(s) orally once a day  glucosamine-chondroitin 500 mg-400 mg oral tablet: 1 tab(s) orally once a day  Talicia oral delayed release capsule: 4 orally  Vitamin D3 1000 intl units oral tablet: 1 tab(s) orally once a day

## 2023-06-20 NOTE — DISCHARGE NOTE PROVIDER - CARE PROVIDER_API CALL
Fermin Torres  Vascular Surgery  [address] [facility], [address]  [place] [address]  Phone: [phone]  Fax: [phone]  Follow Up Time: 1 week    Primary Care Physician,   Phone: (   )    -  Fax: (   )    -  Follow Up Time: 1 week   Fermin Torres  Vascular Surgery  [address] [facility], [address]  [place] [address]  Phone: [phone]  Fax: [phone]  Follow Up Time: 1 week    Primary Care Physician,   Phone: (   )    -  Fax: (   )    -  Follow Up Time: 1 week    [facility],   [address] [place] [id number]  Phone: [phone]  Fax: (   )    -  Follow Up Time: 2 weeks

## 2023-06-20 NOTE — DISCHARGE NOTE PROVIDER - PROVIDER TOKENS
PROVIDER:[TOKEN:[[id number]],FOLLOWUP:[1 week]],FREE:[LAST:[Primary Care Physician],PHONE:[(   )    -],FAX:[(   )    -],FOLLOWUP:[1 week]] PROVIDER:[TOKEN:[[id number]],FOLLOWUP:[1 week]],FREE:[LAST:[Primary Care Physician],PHONE:[(   )    -],FAX:[(   )    -],FOLLOWUP:[1 week]],FREE:[LAST:[[facility] Clinic],PHONE:[phone]],FAX:[(   )    -],ADDRESS:[[address] [place] [address]],FOLLOWUP:[2 weeks]]

## 2023-06-21 ENCOUNTER — TRANSCRIPTION ENCOUNTER (OUTPATIENT)
Age: 73
End: 2023-06-21

## 2023-06-21 VITALS
RESPIRATION RATE: 18 BRPM | TEMPERATURE: 97 F | DIASTOLIC BLOOD PRESSURE: 74 MMHG | HEART RATE: 55 BPM | SYSTOLIC BLOOD PRESSURE: 153 MMHG | OXYGEN SATURATION: 97 %

## 2023-06-21 LAB
ANION GAP SERPL CALC-SCNC: 9 MMOL/L — SIGNIFICANT CHANGE UP (ref 5–17)
BUN SERPL-MCNC: 21 MG/DL — SIGNIFICANT CHANGE UP (ref 7–23)
CALCIUM SERPL-MCNC: 8.5 MG/DL — SIGNIFICANT CHANGE UP (ref 8.4–10.5)
CHLORIDE SERPL-SCNC: 108 MMOL/L — SIGNIFICANT CHANGE UP (ref 96–108)
CO2 SERPL-SCNC: 24 MMOL/L — SIGNIFICANT CHANGE UP (ref 22–31)
CREAT SERPL-MCNC: 0.91 MG/DL — SIGNIFICANT CHANGE UP (ref 0.5–1.3)
EGFR: 67 ML/MIN/1.73M2 — SIGNIFICANT CHANGE UP
GLUCOSE SERPL-MCNC: 83 MG/DL — SIGNIFICANT CHANGE UP (ref 70–99)
HCT VFR BLD CALC: 39.5 % — SIGNIFICANT CHANGE UP (ref 34.5–45)
HGB BLD-MCNC: 12.9 G/DL — SIGNIFICANT CHANGE UP (ref 11.5–15.5)
MAGNESIUM SERPL-MCNC: 2.1 MG/DL — SIGNIFICANT CHANGE UP (ref 1.6–2.6)
MCHC RBC-ENTMCNC: 29.8 PG — SIGNIFICANT CHANGE UP (ref 27–34)
MCHC RBC-ENTMCNC: 32.7 GM/DL — SIGNIFICANT CHANGE UP (ref 32–36)
MCV RBC AUTO: 91.2 FL — SIGNIFICANT CHANGE UP (ref 80–100)
NRBC # BLD: 0 /100 WBCS — SIGNIFICANT CHANGE UP (ref 0–0)
PHOSPHATE SERPL-MCNC: 3.9 MG/DL — SIGNIFICANT CHANGE UP (ref 2.5–4.5)
PLATELET # BLD AUTO: 153 K/UL — SIGNIFICANT CHANGE UP (ref 150–400)
POTASSIUM SERPL-MCNC: 3.8 MMOL/L — SIGNIFICANT CHANGE UP (ref 3.5–5.3)
POTASSIUM SERPL-SCNC: 3.8 MMOL/L — SIGNIFICANT CHANGE UP (ref 3.5–5.3)
RBC # BLD: 4.33 M/UL — SIGNIFICANT CHANGE UP (ref 3.8–5.2)
RBC # FLD: 13.6 % — SIGNIFICANT CHANGE UP (ref 10.3–14.5)
SODIUM SERPL-SCNC: 141 MMOL/L — SIGNIFICANT CHANGE UP (ref 135–145)
WBC # BLD: 9.75 K/UL — SIGNIFICANT CHANGE UP (ref 3.8–10.5)
WBC # FLD AUTO: 9.75 K/UL — SIGNIFICANT CHANGE UP (ref 3.8–10.5)

## 2023-06-21 PROCEDURE — 84100 ASSAY OF PHOSPHORUS: CPT

## 2023-06-21 PROCEDURE — C1894: CPT

## 2023-06-21 PROCEDURE — 80048 BASIC METABOLIC PNL TOTAL CA: CPT

## 2023-06-21 PROCEDURE — 85027 COMPLETE CBC AUTOMATED: CPT

## 2023-06-21 PROCEDURE — C1876: CPT

## 2023-06-21 PROCEDURE — 74174 CTA ABD&PLVS W/CONTRAST: CPT

## 2023-06-21 PROCEDURE — 76000 FLUOROSCOPY <1 HR PHYS/QHP: CPT

## 2023-06-21 PROCEDURE — 86901 BLOOD TYPING SEROLOGIC RH(D): CPT

## 2023-06-21 PROCEDURE — C1725: CPT

## 2023-06-21 PROCEDURE — 86900 BLOOD TYPING SEROLOGIC ABO: CPT

## 2023-06-21 PROCEDURE — 82962 GLUCOSE BLOOD TEST: CPT

## 2023-06-21 PROCEDURE — C9399: CPT

## 2023-06-21 PROCEDURE — 86850 RBC ANTIBODY SCREEN: CPT

## 2023-06-21 PROCEDURE — C1887: CPT

## 2023-06-21 PROCEDURE — 74174 CTA ABD&PLVS W/CONTRAST: CPT | Mod: 26

## 2023-06-21 PROCEDURE — C1769: CPT

## 2023-06-21 PROCEDURE — C1889: CPT

## 2023-06-21 PROCEDURE — 83735 ASSAY OF MAGNESIUM: CPT

## 2023-06-21 PROCEDURE — 82150 ASSAY OF AMYLASE: CPT

## 2023-06-21 RX ORDER — ASPIRIN/CALCIUM CARB/MAGNESIUM 324 MG
1 TABLET ORAL
Qty: 30 | Refills: 0
Start: 2023-06-21 | End: 2023-07-20

## 2023-06-21 RX ORDER — ACETAMINOPHEN 500 MG
2 TABLET ORAL
Qty: 0 | Refills: 0 | DISCHARGE
Start: 2023-06-21

## 2023-06-21 RX ORDER — CLOPIDOGREL BISULFATE 75 MG/1
1 TABLET, FILM COATED ORAL
Qty: 30 | Refills: 0
Start: 2023-06-21 | End: 2023-07-20

## 2023-06-21 RX ADMIN — HEPARIN SODIUM 5000 UNIT(S): 5000 INJECTION INTRAVENOUS; SUBCUTANEOUS at 15:22

## 2023-06-21 RX ADMIN — Medication 1000 UNIT(S): at 12:25

## 2023-06-21 RX ADMIN — HEPARIN SODIUM 5000 UNIT(S): 5000 INJECTION INTRAVENOUS; SUBCUTANEOUS at 05:56

## 2023-06-21 RX ADMIN — CLOPIDOGREL BISULFATE 75 MILLIGRAM(S): 75 TABLET, FILM COATED ORAL at 12:25

## 2023-06-21 RX ADMIN — Medication 81 MILLIGRAM(S): at 12:25

## 2023-06-21 NOTE — PROGRESS NOTE ADULT - SUBJECTIVE AND OBJECTIVE BOX
VASCULAR SURGERY DAILY PROGRESS NOTE    SUBJECTIVE:  Patient seen and examined at bedside during morning rounds. No overnight events. Patient feeling well. Awaiting CTA today.    Vital Signs Last 24 Hrs  T(C): 36.5 (21 Jun 2023 05:01), Max: 36.7 (20 Jun 2023 17:01)  T(F): 97.7 (21 Jun 2023 05:01), Max: 98 (20 Jun 2023 17:01)  HR: 60 (21 Jun 2023 05:01) (52 - 65)  BP: 109/62 (21 Jun 2023 05:01) (100/59 - 128/67)  BP(mean): --  RR: 18 (21 Jun 2023 05:01) (18 - 18)  SpO2: 97% (21 Jun 2023 05:01) (93% - 97%)    Parameters below as of 21 Jun 2023 05:01  Patient On (Oxygen Delivery Method): room air      I&Os    06-20-23 @ 07:01  -  06-21-23 @ 07:00  --------------------------------------------------------  IN: 720 mL / OUT: 1950 mL / NET: -1230 mL      PHYSICAL EXAM:  General: NAD, resting comfortably in bed  HEENT: Normocephalic atraumatic  Respiratory: Nonlabored respirations,   Abdomen: nondistended, nontender. Erythema noted over gluteal cleft with allevyn pad in place. No skin breakdown seen  Vascular: extremities are warm and well perfused. Neurovasc intact in LUE, radial and ulnar pulses palpable. LUE dressing intact and dry.        MEDICATIONS:  acetaminophen     Tablet .. 650 milliGRAM(s) Oral every 6 hours  amLODIPine   Tablet 10 milliGRAM(s) Oral daily  aspirin  chewable 81 milliGRAM(s) Oral daily  cholecalciferol 1000 Unit(s) Oral daily  clopidogrel Tablet 75 milliGRAM(s) Oral daily  heparin   Injectable 5000 Unit(s) SubCutaneous every 8 hours  losartan 100 milliGRAM(s) Oral daily  oxyCODONE    IR 5 milliGRAM(s) Oral every 4 hours PRN      LABS:                        13.5   9.99  )-----------( 159      ( 20 Jun 2023 06:22 )             39.4     06-20    139  |  104  |  17  ----------------------------<  123<H>  4.3   |  21<L>  |  0.87    Ca    8.6      20 Jun 2023 06:22  Phos  4.1     06-20  Mg     2.2     06-20

## 2023-06-21 NOTE — DISCHARGE NOTE NURSING/CASE MANAGEMENT/SOCIAL WORK - NSDCPEFALRISK_GEN_ALL_CORE
For information on Fall & Injury Prevention, visit: [email or web address] OR  [email or web address] OR  https://www.cdc.gov/steadi/patient.html

## 2023-06-21 NOTE — DISCHARGE NOTE NURSING/CASE MANAGEMENT/SOCIAL WORK - NSDCPNINST_GEN_ALL_CORE
Call MD or go to ER if severe  pain not relieved with pain meds, nausea, vomiting, fever, signs and symptoms of infections.

## 2023-06-21 NOTE — PROGRESS NOTE ADULT - ASSESSMENT
73F former smoker (60 pack years) w/ PMHx HTN, murmur, Pre-diabetes (not on medication),Dyslipidemia, Gout, Varicose vein, OA s/p left knee replacement, Right shoulder Rotator cuff tear s/p rotator cuff repair, Scoliosis, Lumbar stenosis most recently w/ d/o loss appetite, increase belching, back pain. Pt state she has loss over 30 lbs in the past 3 months. 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

## 2023-06-22 ENCOUNTER — NON-APPOINTMENT (OUTPATIENT)
Age: 73
End: 2023-06-22

## 2023-07-29 NOTE — ANESTHESIA FOLLOW-UP NOTE - NSEVALATION_GEN_ALL_CORE
All questions were answered/No apparent complications or complaints reguarding anesthesia care at this time
0

## 2023-10-05 NOTE — PHYSICAL THERAPY INITIAL EVALUATION ADULT - ADDITIONAL COMMENTS
Home
Pt lives with daughter in a house w/ 4 steps to enter with no rail.  Pt is on the main level where there is a bedroom/bathroom.  Pt has rolling walker

## 2023-11-24 NOTE — DISCHARGE NOTE ADULT - NS MD DC PLAN IMMU FLU PROVIDE INFO
It was great seeing Flavio today!      Plan:  Labs and bone age X-ray  Orders Placed This Encounter    XR BONE AGE HAND AND WRIST    Vitamin D -25 Hydroxy    Thyroid Stimulating Hormone    Free T4     2. Follow up in 6 months    Risks/benefits discussed with patient or patient surrogate

## 2023-12-19 PROBLEM — Z98.890 OTHER SPECIFIED POSTPROCEDURAL STATES: Chronic | Status: ACTIVE | Noted: 2023-05-24

## 2024-01-03 ENCOUNTER — APPOINTMENT (OUTPATIENT)
Dept: ORTHOPEDIC SURGERY | Facility: CLINIC | Age: 74
End: 2024-01-03
Payer: MEDICARE

## 2024-01-03 ENCOUNTER — APPOINTMENT (OUTPATIENT)
Dept: PULMONOLOGY | Facility: CLINIC | Age: 74
End: 2024-01-03
Payer: MEDICARE

## 2024-01-03 ENCOUNTER — NON-APPOINTMENT (OUTPATIENT)
Age: 74
End: 2024-01-03

## 2024-01-03 VITALS — WEIGHT: 172 LBS | HEART RATE: 60 BPM | HEIGHT: 61 IN | BODY MASS INDEX: 32.47 KG/M2 | OXYGEN SATURATION: 94 %

## 2024-01-03 VITALS
DIASTOLIC BLOOD PRESSURE: 77 MMHG | HEART RATE: 56 BPM | WEIGHT: 172 LBS | HEIGHT: 63 IN | SYSTOLIC BLOOD PRESSURE: 159 MMHG | BODY MASS INDEX: 30.48 KG/M2

## 2024-01-03 VITALS
HEIGHT: 61 IN | DIASTOLIC BLOOD PRESSURE: 76 MMHG | WEIGHT: 176 LBS | OXYGEN SATURATION: 96 % | TEMPERATURE: 97.9 F | SYSTOLIC BLOOD PRESSURE: 166 MMHG | BODY MASS INDEX: 33.23 KG/M2 | HEART RATE: 63 BPM

## 2024-01-03 DIAGNOSIS — M47.816 SPONDYLOSIS W/OUT MYELOPATHY OR RADICULOPATHY, LUMBAR REGION: ICD-10-CM

## 2024-01-03 DIAGNOSIS — M48.07 SPINAL STENOSIS, LUMBOSACRAL REGION: ICD-10-CM

## 2024-01-03 DIAGNOSIS — M41.9 SCOLIOSIS, UNSPECIFIED: ICD-10-CM

## 2024-01-03 PROCEDURE — 99204 OFFICE O/P NEW MOD 45 MIN: CPT | Mod: 25

## 2024-01-03 PROCEDURE — 94060 EVALUATION OF WHEEZING: CPT

## 2024-01-03 PROCEDURE — 94727 GAS DIL/WSHOT DETER LNG VOL: CPT

## 2024-01-03 PROCEDURE — 99213 OFFICE O/P EST LOW 20 MIN: CPT

## 2024-01-03 PROCEDURE — 94729 DIFFUSING CAPACITY: CPT

## 2024-01-03 NOTE — REVIEW OF SYSTEMS
[Cough] : cough [Palpitations] : palpitations [Fever] : no fever [Fatigue] : no fatigue [Sinus Problems] : no sinus problems [Hemoptysis] : no hemoptysis [Sputum] : no sputum [Dyspnea] : no dyspnea [Chest Discomfort] : no chest discomfort [Edema] : no edema [Hay Fever] : no hay fever [GERD] : no gerd [Dysuria] : no dysuria [Myalgias] : no myalgias [History of Iron Deficiency] : no history of iron deficiency [Headache] : no headache [Diabetes] : no diabetes [Thyroid Problem] : no thyroid problem

## 2024-01-03 NOTE — PROCEDURE
[FreeTextEntry1] : PFT 1/3/24: No obstruction.  Mild restriction.  Mild reduction in diffusion.  Significant improvement noted after administration of an inhaled bronchodilator.

## 2024-01-03 NOTE — DISCUSSION/SUMMARY
[PRN] : PRN [de-identified] : I discussed the underlying pathophysiology of the patient's condition & MRI findings. I expressed to the patient that her symptoms are consistent with foraminal lumbar stenosis marked at L2-3 & L3-4. The patient and I discussed her options regarding treatment. I advised the patient that she should focus on core strengthening and hamstring stretches. I highly recommended that the patient starts a course of physical therapy at this time. I provided the patient with a detailed prescription for physical therapy. If the patient begins to experience any changes or severe exacerbation of her symptoms, she should reach out to me as soon as possible. Otherwise, she should return to me as needed.

## 2024-01-03 NOTE — PHYSICAL EXAM
[Cane] : ambulates with cane [LE] : Sensory: Intact in bilateral lower extremities [Obese] : obese [Normal] : Oriented to person, place, and time, insight and judgement were intact and the affect was normal [de-identified] : There is tenderness palpation over the right flank and the lower lumbar area. [de-identified] : MRI Evaluation of the Lumbar Spine performed on 4/19/23 shows L2-3 & L3-4 foraminal stenosis on the right. L4-5 foraminal stenosis on the left.

## 2024-01-03 NOTE — HISTORY OF PRESENT ILLNESS
[4] : a minimum pain level of 4/10 [6] : a maximum pain level of 6/10 [Walking] : walking [Standing] : standing [Constant] : ~He/She~ states the symptoms seem to be constant [de-identified] : A 73-year-old female presents an initial visit for lower back pain with radiculopathy. Patient has a Hx of Scoliosis. The patient was seen by Dr. Calderon & Dr. Jett in 2021 & 2022. The patient states that she's had pain pertaining to her right lower back for the past few months which has increased and is progressively getting worse. She states that her pain radiates into her right buttock area and has done physical therapy in the past. The patient denies any symptoms of numbness, tingling, or weakness. She notes a burning sensation to her groin area. The patient currently takes no pain medication at this time. The patient currently ambulates with a cane.  She recently describes having abdominal vascular surgery and probably has become increasingly deconditioned as a result.

## 2024-01-03 NOTE — HISTORY OF PRESENT ILLNESS
[TextBox_4] : She is a 73-year-old former smoker was advised by cardiology to be seen by pulmonary.  She complains of a chronic cough.  She has been given antibiotics without improvement.  The cough is mostly nonproductive.  She has not noted any hemoptysis.  She denied any constitutional symptoms.  Her occupational history was unremarkable. [Difficulty Maintaining Sleep] : does not have difficulty maintaining sleep

## 2024-01-03 NOTE — DISCUSSION/SUMMARY
[FreeTextEntry1] : She is a 73-year-old former smoker was advised by cardiology to be seen by pulmonary.  She complains of a chronic cough.  She has been given antibiotics without improvement.  The cough is mostly nonproductive.  She has not noted any hemoptysis.  She denied any constitutional symptoms.  Her occupational history was unremarkable.  Impression Chronic cough -Reactive airways disease suggested by pulmonary function testing  Recommend Trial of Trelegy Chest radiograph Follow-up in 1 month -Sooner if needed

## 2024-01-03 NOTE — ADDENDUM
[FreeTextEntry1] : I, Juan Antonio Barrios Jr, acted solely as a scribe for Dr. Larry Murphy on this date 01/03/2024 .  All medical record entries made by the Scribe were at my, Dr. Larry Murphy, direction and personally dictated by me on 01/03/2024 . I have reviewed the chart and agree that the record accurately reflects my personal performance of the history, physical exam, assessment and plan. I have also personally directed, reviewed, and agreed with the chart.

## 2024-01-08 ENCOUNTER — APPOINTMENT (OUTPATIENT)
Dept: RADIOLOGY | Facility: IMAGING CENTER | Age: 74
End: 2024-01-08
Payer: MEDICARE

## 2024-01-08 ENCOUNTER — OUTPATIENT (OUTPATIENT)
Dept: OUTPATIENT SERVICES | Facility: HOSPITAL | Age: 74
LOS: 1 days | End: 2024-01-08
Payer: MEDICARE

## 2024-01-08 DIAGNOSIS — R05.3 CHRONIC COUGH: ICD-10-CM

## 2024-01-08 DIAGNOSIS — Z96.652 PRESENCE OF LEFT ARTIFICIAL KNEE JOINT: Chronic | ICD-10-CM

## 2024-01-08 PROCEDURE — 71046 X-RAY EXAM CHEST 2 VIEWS: CPT | Mod: 26

## 2024-01-08 PROCEDURE — 71046 X-RAY EXAM CHEST 2 VIEWS: CPT

## 2024-01-31 ENCOUNTER — APPOINTMENT (OUTPATIENT)
Dept: PULMONOLOGY | Facility: CLINIC | Age: 74
End: 2024-01-31
Payer: MEDICARE

## 2024-01-31 VITALS
BODY MASS INDEX: 33.07 KG/M2 | SYSTOLIC BLOOD PRESSURE: 158 MMHG | TEMPERATURE: 97.3 F | OXYGEN SATURATION: 98 % | WEIGHT: 175 LBS | HEART RATE: 54 BPM | DIASTOLIC BLOOD PRESSURE: 58 MMHG

## 2024-01-31 DIAGNOSIS — R93.89 ABNORMAL FINDINGS ON DIAGNOSTIC IMAGING OF OTHER SPECIFIED BODY STRUCTURES: ICD-10-CM

## 2024-01-31 PROCEDURE — 99213 OFFICE O/P EST LOW 20 MIN: CPT

## 2024-01-31 RX ORDER — FLUTICASONE FUROATE, UMECLIDINIUM BROMIDE AND VILANTEROL TRIFENATATE 100; 62.5; 25 UG/1; UG/1; UG/1
100-62.5-25 POWDER RESPIRATORY (INHALATION) DAILY
Qty: 1 | Refills: 2 | Status: ACTIVE | COMMUNITY
Start: 2024-01-03 | End: 1900-01-01

## 2024-01-31 RX ORDER — FLUTICASONE FUROATE, UMECLIDINIUM BROMIDE AND VILANTEROL TRIFENATATE 100; 62.5; 25 UG/1; UG/1; UG/1
100-62.5-25 POWDER RESPIRATORY (INHALATION) DAILY
Qty: 1 | Refills: 2 | Status: COMPLETED | COMMUNITY
Start: 2024-01-03 | End: 2024-01-31

## 2024-01-31 NOTE — DISCUSSION/SUMMARY
[FreeTextEntry1] : She is a 74 year-old former smoker was advised by cardiology to be seen by pulmonary.  She complains of a chronic cough.  She has been given antibiotics without improvement.  The cough is mostly nonproductive.  She has not noted any hemoptysis.  She denied any constitutional symptoms.  Her occupational history was unremarkable.  Impression Chronic cough -Reactive airways disease suggested by pulmonary function testing - improved with Trelegy  Recommend Continue with Trelegy -May be able to taper down on next visit Follow-up in 1 to 2 months -Sooner if needed

## 2024-01-31 NOTE — REVIEW OF SYSTEMS
[Palpitations] : palpitations [Fever] : no fever [Fatigue] : no fatigue [Sinus Problems] : no sinus problems [Cough] : cough [Hemoptysis] : no hemoptysis [Sputum] : no sputum [Dyspnea] : no dyspnea [Chest Discomfort] : no chest discomfort [Edema] : no edema [Hay Fever] : no hay fever [GERD] : no gerd [Dysuria] : no dysuria [Myalgias] : no myalgias [History of Iron Deficiency] : no history of iron deficiency [Headache] : no headache [Diabetes] : no diabetes [Thyroid Problem] : no thyroid problem

## 2024-01-31 NOTE — HISTORY OF PRESENT ILLNESS
[Never] : never [TextBox_4] : She is a 73-year-old former smoker was advised by cardiology to be seen by pulmonary.  She complains of a chronic cough.  She has been given antibiotics without improvement.  The cough is mostly nonproductive.  She has not noted any hemoptysis.  She denied any constitutional symptoms.  Her occupational history was unremarkable.  She is feeling much better.  She was placed on Trelegy. [Difficulty Maintaining Sleep] : does not have difficulty maintaining sleep

## 2024-01-31 NOTE — PHYSICAL EXAM
[Normal Appearance] : normal appearance [No Neck Mass] : no neck mass [Normal Rate/Rhythm] : normal rate/rhythm [Normal S1, S2] : normal s1, s2 [Clear to Auscultation Bilaterally] : clear to auscultation bilaterally [Benign] : benign [No HSM] : no hsm [Normal Gait] : normal gait [No Clubbing] : no clubbing [No Edema] : no edema [Normal Turgor] : normal turgor [No Focal Deficits] : no focal deficits [Oriented x3] : oriented x3

## 2024-02-12 ENCOUNTER — APPOINTMENT (OUTPATIENT)
Dept: CT IMAGING | Facility: IMAGING CENTER | Age: 74
End: 2024-02-12

## 2024-03-20 DIAGNOSIS — R05.3 CHRONIC COUGH: ICD-10-CM

## 2024-03-27 ENCOUNTER — APPOINTMENT (OUTPATIENT)
Dept: PULMONOLOGY | Facility: CLINIC | Age: 74
End: 2024-03-27

## 2024-04-03 ENCOUNTER — OUTPATIENT (OUTPATIENT)
Dept: OUTPATIENT SERVICES | Facility: HOSPITAL | Age: 74
LOS: 1 days | End: 2024-04-03
Payer: MEDICARE

## 2024-04-03 ENCOUNTER — APPOINTMENT (OUTPATIENT)
Dept: RADIOLOGY | Facility: IMAGING CENTER | Age: 74
End: 2024-04-03
Payer: MEDICARE

## 2024-04-03 DIAGNOSIS — Z96.652 PRESENCE OF LEFT ARTIFICIAL KNEE JOINT: Chronic | ICD-10-CM

## 2024-04-03 DIAGNOSIS — R05.3 CHRONIC COUGH: ICD-10-CM

## 2024-04-03 PROCEDURE — 71046 X-RAY EXAM CHEST 2 VIEWS: CPT

## 2024-04-03 PROCEDURE — 71046 X-RAY EXAM CHEST 2 VIEWS: CPT | Mod: 26

## 2024-04-04 ENCOUNTER — APPOINTMENT (OUTPATIENT)
Dept: PULMONOLOGY | Facility: CLINIC | Age: 74
End: 2024-04-04
Payer: MEDICARE

## 2024-04-04 VITALS
BODY MASS INDEX: 32.47 KG/M2 | OXYGEN SATURATION: 97 % | HEART RATE: 69 BPM | DIASTOLIC BLOOD PRESSURE: 78 MMHG | SYSTOLIC BLOOD PRESSURE: 151 MMHG | HEIGHT: 61 IN | WEIGHT: 172 LBS

## 2024-04-04 DIAGNOSIS — Z01.818 ENCOUNTER FOR OTHER PREPROCEDURAL EXAMINATION: ICD-10-CM

## 2024-04-04 DIAGNOSIS — I34.0 NONRHEUMATIC MITRAL (VALVE) INSUFFICIENCY: ICD-10-CM

## 2024-04-04 PROCEDURE — 94010 BREATHING CAPACITY TEST: CPT

## 2024-04-04 PROCEDURE — 94729 DIFFUSING CAPACITY: CPT

## 2024-04-04 PROCEDURE — 99203 OFFICE O/P NEW LOW 30 MIN: CPT | Mod: 25

## 2024-04-04 PROCEDURE — ZZZZZ: CPT

## 2024-04-04 PROCEDURE — 94726 PLETHYSMOGRAPHY LUNG VOLUMES: CPT

## 2024-04-04 RX ORDER — AMLODIPINE BESYLATE 5 MG/1
TABLET ORAL
Refills: 0 | Status: ACTIVE | COMMUNITY

## 2024-04-04 RX ORDER — PANTOPRAZOLE 40 MG/1
40 TABLET, DELAYED RELEASE ORAL
Refills: 0 | Status: ACTIVE | COMMUNITY

## 2024-04-04 RX ORDER — CLOPIDOGREL BISULFATE 75 MG/1
75 TABLET, FILM COATED ORAL
Refills: 0 | Status: ACTIVE | COMMUNITY

## 2024-04-04 RX ORDER — TORSEMIDE 5 MG/1
5 TABLET ORAL
Refills: 0 | Status: ACTIVE | COMMUNITY

## 2024-04-04 NOTE — REVIEW OF SYSTEMS
[SOB on Exertion] : sob on exertion [Chest Discomfort] : chest discomfort [GERD] : gerd [Arthralgias] : arthralgias [Negative] : Endocrine

## 2024-04-04 NOTE — DISCUSSION/SUMMARY
[FreeTextEntry1] : The patient had a chest radiograph which appears to suggest congestive heart failure.  The patient's lung functions revealed normal lung volumes and normal flow rates with a severe reduction in her diffusing capacity likely related in part to her heart failure.  I suggested that she speak to her cardiologist about increasing the diuretic and not decreasing it.  She is due for laboratory work also. She is cleared for surgery from a pulmonary standpoint.

## 2024-04-04 NOTE — PHYSICAL EXAM
[No Acute Distress] : no acute distress [Normal Appearance] : normal appearance [Normal Rate/Rhythm] : normal rate/rhythm [No Resp Distress] : no resp distress [No Focal Deficits] : no focal deficits [TextBox_54] : 3/6 pansystolic murmur left apex [TextBox_68] : Crackles right lung base [TextBox_99] : Wheelchair [TextBox_105] : Trace of edema

## 2024-04-04 NOTE — HISTORY OF PRESENT ILLNESS
[TextBox_4] : 74-year-old female scheduled for mitral valve surgery and possible coronary artery surgery.,  The patient has a significant smoking history with at least 20 to 30 pack years but she gave it up many years ago.  She recently had been started on Trelegy but she did not feel that it helped and so she stopped it.  She did notice increasing peripheral edema and increasing dyspnea for which she was started recently on a low-dose of torsemide.  She reports her breathing is improved as did the peripheral edema. The patient also complains of chest tightness radiating up to her jaw and a great deal of belching.  She does take pantoprazole twice a day and she currently is on clopidogrel and amlodipine.

## 2024-05-22 NOTE — PRE-OP CHECKLIST - TYPE OF SOLUTION
You were seen for back pain, your xrays of the spine appear normal we will call if the radiologist sees any other findings. You may take mobic daily as needed for pain and cyclobenzaprine 10mg up to 3 times daily as needed for pain you may also place lidocaine patches on the areas that are painful and apply ice to the painful areas.   Gradually return to your normal levels of activity.    ivl

## 2025-03-05 NOTE — H&P PST ADULT - ASSESSMENT
Problem: Pain  Goal: Acceptable pain level achieved/maintained at rest using appropriate pain scale for the patient  Outcome: Monitoring/Evaluating progress     Problem: At Risk for Falls  Goal: Patient does not fall  Outcome: Monitoring/Evaluating progress      DASI Score:  DASI Activity:  able to go up one flight of stairs or walk 1-2 blocks with out difficulty  Loose or removable teeth: denies  DASI Score:7.44  DASI Activity: able to perform adl's with out assistnace, able to go up one flight of stairs or walk 1-2 blocks with out difficulty  Loose or removable teeth: upper denture  CAPRINI SCORE [CLOT]    AGE RELATED RISK FACTORS                                                       MOBILITY RELATED FACTORS  [ ] Age 41-60 years                                            (1 Point)                  [ ] Bed rest                                                        (1 Point)  [ x] Age: 61-74 years                                           (2 Points)                 [ ] Plaster cast                                                   (2 Points)  [ ] Age= 75 years                                              (3 Points)                 [ ] Bed bound for more than 72 hours                 (2 Points)    DISEASE RELATED RISK FACTORS                                               GENDER SPECIFIC FACTORS  [ ] Edema in the lower extremities                       (1 Point)                  [ ] Pregnancy                                                     (1 Point)  [ x] Varicose veins                                               (1 Point)                  [ ] Post-partum < 6 weeks                                   (1 Point)             [ x] BMI > 25 Kg/m2                                            (1 Point)                  [ ] Hormonal therapy  or oral contraception          (1 Point)                 [ ] Sepsis (in the previous month)                        (1 Point)                  [ ] History of pregnancy complications                 (1 point)  [ ] Pneumonia or serious lung disease                                               [ ] Unexplained or recurrent                     (1 Point)           (in the previous month)                               (1 Point)  [ ] Abnormal pulmonary function test                     (1 Point)                 SURGERY RELATED RISK FACTORS  [ ] Acute myocardial infarction                              (1 Point)                 [ ]  Section                                             (1 Point)  [ ] Congestive heart failure (in the previous month)  (1 Point)               [ ] Minor surgery                                                  (1 Point)   [ ] Inflammatory bowel disease                             (1 Point)                 [ ] Arthroscopic surgery                                        (2 Points)  [ ] Central venous access                                      (2 Points)                [ x] General surgery lasting more than 45 minutes   (2 Points)       [ ] Stroke (in the previous month)                          (5 Points)               [ ] Elective arthroplasty                                         (5 Points)                                                                                                                                               HEMATOLOGY RELATED FACTORS                                                 TRAUMA RELATED RISK FACTORS  [ ] Prior episodes of VTE                                     (3 Points)                [ ] Fracture of the hip, pelvis, or leg                       (5 Points)  [ ] Positive family history for VTE                         (3 Points)                 [ ] Acute spinal cord injury (in the previous month)  (5 Points)  [ ] Prothrombin 54774 A                                     (3 Points)                 [ ] Paralysis  (less than 1 month)                             (5 Points)  [ ] Factor V Leiden                                             (3 Points)                  [ ] Multiple Trauma within 1 month                        (5 Points)  [ ] Lupus anticoagulants                                     (3 Points)                                                           [ ] Anticardiolipin antibodies                               (3 Points)                                                       [ ] High homocysteine in the blood                      (3 Points)                                             [ ] Other congenital or acquired thrombophilia      (3 Points)                                                [ ] Heparin induced thrombocytopenia                  (3 Points)                                          Total Score [ 6  ]    Caprini Score 0 - 2:  Low Risk, No VTE Prophylaxis required for most patients, encourage ambulation  Caprini Score 3 - 6:  At Risk, pharmacologic VTE prophylaxis is indicated for most patients (in the absence of a contraindication)  Caprini Score Greater than or = 7:  High Risk, pharmacologic VTE prophylaxis is indicated for most patients (in the absence of a contraindication)

## 2025-05-07 NOTE — H&P PST ADULT - I HAVE PERSONALLY SEEN AND EXAMINED THE PATIENT. THERE HAVE NOT BEEN ANY CHANGES IN THE PATIENT'S HISTORY OR EXAM UNLESS COMMENTED BELOW
Detail Level: Zone
Patient Specific Counseling (Will Not Stick From Patient To Patient): After discussing risks of Ln2, provided complimentary Ln2 to lesion o nape of neck
Statement Selected

## 2025-06-20 NOTE — PATIENT PROFILE ADULT - PHONE #
Medicare Annual Wellness Visit    Catrachita Parsons is here for Medicare AWV and Diabetes    Assessment & Plan  1. Annual wellness visit.  - Her A1c levels are commendable at 6.1.  - She is due for a mammogram, but a recent PET scan may suffice. A bone density test is recommended.  - She has been advised to consume 1200 mg of calcium daily, either through diet or supplements, and to take 800 units of vitamin D daily.  - An order for cholesterol and vitamin D level tests will be provided for her to take to her oncologist.    2. Neuroendocrine tumor.  - She is currently on lanreotide every 28 days, which appears to be effective in managing her condition.  - The side effects of rhinorrhea and dizziness have been discussed.  - A prescription for Zofran will be provided to manage her nausea.  - Continued monitoring and management of her condition will be coordinated with her oncologist.    3. Heart rate management.  - She is on carvedilol, which has been effective in maintaining her heart rate in the 70s.  - She will continue with this medication.  - A refill for carvedilol will be sent to her pharmacy.  - Regular follow-up to monitor heart rate and medication effectiveness.    4. Dehydration.  - She has been advised to increase her water intake and avoid caffeinated beverages like iced tea and Coke, which can contribute to dehydration.  - She can use flavored water enhancers without electrolytes to make water more palatable.  - Emphasis on maintaining adequate hydration to prevent dizziness and other symptoms.    Type 2 diabetes mellitus with other specified complication, without long-term current use of insulin (HCC)  -     POCT glycosylated hemoglobin (Hb A1C)  -      DIABETES FOOT EXAM  -     Lipid Panel; Future  Paraganglioma (HCC)  Post-menopausal  -     DEXA Bone Density Axial Skeleton; Future  -     Vitamin D 25 Hydroxy; Future  Vitamin D insufficiency  -     Vitamin D 25 Hydroxy; Future  Medicare annual  [phone]

## (undated) DEVICE — TUBING HIGH POWER CONTRAST INJ

## (undated) DEVICE — TUBING SUCTION 20FT

## (undated) DEVICE — GOWN XL

## (undated) DEVICE — DRAPE 3/4 SHEET W REINFORCEMENT 56X77"

## (undated) DEVICE — SYR ASEPTO

## (undated) DEVICE — PACK FEM/POP

## (undated) DEVICE — TOURNIQUET SET SURE-SNARE 22FR (2 TUBES, 2 UMBILICAL TAPES, 2 PLASTIC SNARES) 5"

## (undated) DEVICE — SUT SOFSILK 2-0 18" TIES

## (undated) DEVICE — SOL INJ NS 0.9% 1000ML

## (undated) DEVICE — GLV 8 PROTEXIS (WHITE)

## (undated) DEVICE — GLV 6.5 PROTEXIS (WHITE)

## (undated) DEVICE — STAPLER SKIN VISI-STAT 35 WIDE

## (undated) DEVICE — DISSECTOR ENDO PEANUT 5MM

## (undated) DEVICE — DRAPE INSTRUMENT POUCH 6.75" X 11"

## (undated) DEVICE — SOL IRR POUR NS 0.9% 500ML

## (undated) DEVICE — SYR LUER LOK 20CC

## (undated) DEVICE — ELCTR HEX BLADE

## (undated) DEVICE — TRAP SPECIMEN SPUTUM 40CC

## (undated) DEVICE — DRAPE LIGHT HANDLE COVER (BLUE)

## (undated) DEVICE — INFLATION DEVICE BASIXCOMPAK

## (undated) DEVICE — TORQUE DEVICE FOR GUIDEWIRE 0.0100.038"

## (undated) DEVICE — GLV 7.5 PROTEXIS (WHITE)

## (undated) DEVICE — SUT SOFSILK 0 18" TIES

## (undated) DEVICE — SUCTION YANKAUER NO CONTROL VENT

## (undated) DEVICE — DRAPE EQUIPMENT BANDED BAG 30 X 30" (SHOWER CAP)

## (undated) DEVICE — WARMING BLANKET UPPER ADULT

## (undated) DEVICE — DRAPE C ARM UNIVERSAL

## (undated) DEVICE — PREP CHLORAPREP HI-LITE ORANGE 26ML

## (undated) DEVICE — DRSG TEGADERM 6"X8"

## (undated) DEVICE — DRSG STERISTRIPS 0.5 X 4"

## (undated) DEVICE — VENODYNE/SCD SLEEVE CALF LARGE

## (undated) DEVICE — MEDICATION LABELS W MARKER

## (undated) DEVICE — GOWN TRIMAX LG

## (undated) DEVICE — WARMING BLANKET LOWER ADULT

## (undated) DEVICE — NDL COUNTER FOAM AND MAGNET 40-70

## (undated) DEVICE — DRAPE MAYO STAND 30"

## (undated) DEVICE — FOLEY TRAY 16FR 5CC LTX UMETER CLOSED

## (undated) DEVICE — SUT SOFSILK 3-0 18" TIES

## (undated) DEVICE — Device

## (undated) DEVICE — SPECIMEN CONTAINER 100ML

## (undated) DEVICE — DRSG OPSITE 13.75 X 4"

## (undated) DEVICE — DRAPE TOWEL BLUE 17" X 24"

## (undated) DEVICE — GLV 7 PROTEXIS (WHITE)

## (undated) DEVICE — SUT POLYSORB 2-0 30" GS-21 UNDYED

## (undated) DEVICE — SUT SOFSILK 4-0 18" TIES

## (undated) DEVICE — TUBING CONTRAST INJECTION HIGH PRESSURE 1200PSI 72"

## (undated) DEVICE — SOL IRR POUR H2O 250ML

## (undated) DEVICE — DRAPE OVERHEAD TABLE COVER 80X90"

## (undated) DEVICE — SUT BIOSYN 4-0 18" P-12